# Patient Record
Sex: FEMALE | Race: WHITE | NOT HISPANIC OR LATINO | Employment: FULL TIME | URBAN - METROPOLITAN AREA
[De-identification: names, ages, dates, MRNs, and addresses within clinical notes are randomized per-mention and may not be internally consistent; named-entity substitution may affect disease eponyms.]

---

## 2017-01-07 ENCOUNTER — HOSPITAL ENCOUNTER (OUTPATIENT)
Dept: MAMMOGRAPHY | Facility: MEDICAL CENTER | Age: 47
Discharge: HOME | End: 2017-01-07
Payer: COMMERCIAL

## 2017-01-07 DIAGNOSIS — Z12.31 VISIT FOR SCREENING MAMMOGRAM: ICD-10-CM

## 2017-01-07 PROCEDURE — G0202 SCR MAMMO BI INCL CAD: HCPCS

## 2017-01-07 PROCEDURE — 77063 BREAST TOMOSYNTHESIS BI: CPT

## 2017-01-09 DIAGNOSIS — R92.8 OTHER ABNORMAL AND INCONCLUSIVE FINDINGS ON DIAGNOSTIC IMAGING OF BREAST: ICD-10-CM

## 2017-01-10 ENCOUNTER — HOSPITAL ENCOUNTER (OUTPATIENT)
Dept: ULTRASOUND IMAGING | Facility: CLINIC | Age: 47
Discharge: HOME | End: 2017-01-10
Payer: COMMERCIAL

## 2017-01-10 ENCOUNTER — HOSPITAL ENCOUNTER (OUTPATIENT)
Dept: MAMMOGRAPHY | Facility: CLINIC | Age: 47
Discharge: HOME | End: 2017-01-10
Payer: COMMERCIAL

## 2017-01-10 DIAGNOSIS — R92.8 ABNORMAL SCREENING MAMMOGRAM: ICD-10-CM

## 2017-01-10 PROCEDURE — 76642 ULTRASOUND BREAST LIMITED: CPT

## 2017-01-10 PROCEDURE — G0206 DX MAMMO INCL CAD UNI: HCPCS

## 2017-01-11 ENCOUNTER — GENERIC CONVERSION - ENCOUNTER (OUTPATIENT)
Dept: OTHER | Facility: OTHER | Age: 47
End: 2017-01-11

## 2017-02-07 ENCOUNTER — GENERIC CONVERSION - ENCOUNTER (OUTPATIENT)
Dept: OTHER | Facility: OTHER | Age: 47
End: 2017-02-07

## 2017-04-14 ENCOUNTER — ALLSCRIPTS OFFICE VISIT (OUTPATIENT)
Dept: OTHER | Facility: OTHER | Age: 47
End: 2017-04-14

## 2017-07-18 ENCOUNTER — TRANSCRIBE ORDERS (OUTPATIENT)
Dept: ADMINISTRATIVE | Facility: HOSPITAL | Age: 47
End: 2017-07-18

## 2017-07-18 DIAGNOSIS — R92.8 OTHER ABNORMAL AND INCONCLUSIVE FINDINGS ON DIAGNOSTIC IMAGING OF BREAST: ICD-10-CM

## 2017-07-18 DIAGNOSIS — R92.8 ABNORMAL MAMMOGRAM: Primary | ICD-10-CM

## 2017-07-26 ENCOUNTER — HOSPITAL ENCOUNTER (OUTPATIENT)
Dept: ULTRASOUND IMAGING | Facility: CLINIC | Age: 47
Discharge: HOME | End: 2017-07-26
Payer: COMMERCIAL

## 2017-07-26 ENCOUNTER — HOSPITAL ENCOUNTER (OUTPATIENT)
Dept: MAMMOGRAPHY | Facility: CLINIC | Age: 47
Discharge: HOME | End: 2017-07-26
Payer: COMMERCIAL

## 2017-07-26 ENCOUNTER — GENERIC CONVERSION - ENCOUNTER (OUTPATIENT)
Dept: OTHER | Facility: OTHER | Age: 47
End: 2017-07-26

## 2017-07-26 DIAGNOSIS — R92.8 OTHER ABNORMAL AND INCONCLUSIVE FINDINGS ON DIAGNOSTIC IMAGING OF BREAST: ICD-10-CM

## 2017-07-26 PROCEDURE — 76642 ULTRASOUND BREAST LIMITED: CPT

## 2017-07-26 PROCEDURE — G0206 DX MAMMO INCL CAD UNI: HCPCS

## 2017-10-18 ENCOUNTER — ALLSCRIPTS OFFICE VISIT (OUTPATIENT)
Dept: OTHER | Facility: OTHER | Age: 47
End: 2017-10-18

## 2017-10-19 NOTE — PROGRESS NOTES
Assessment  1  GERD (gastroesophageal reflux disease) (530 81) (K21 9)   2  Allergic rhinitis (477 9) (J30 9)   3  Anxiety (300 00) (F41 9)   4  Depression (311) (F32 9)    Plan  Allergic rhinitis    · Fluticasone Propionate 50 MCG/ACT Nasal Suspension; USE 1 SPRAY IN  EACH NOSTRIL TWICE DAILY  Allergic rhinitis, Anxiety, Depression, GERD (gastroesophageal reflux disease)    · Follow-up visit in 6 months Evaluation and Treatment  Follow-up  Status: Hold For -  Scheduling  Requested for: 18Oct2017  Anxiety, Depression    · From  Sertraline HCl - 25 MG Oral Tablet TAKE 1 TABLET DAILY To Sertraline  HCl - 50 MG Oral Tablet TAKE 1 TABLET DAILY  GERD (gastroesophageal reflux disease)    · Pantoprazole Sodium 40 MG Oral Tablet Delayed Release (Protonix); take 1  tablet by mouth once daily    Chief Complaint  patient present's today to discuss medication changes and refills   concerns about her allergies      History of Present Illness  HPI: Patient here to follow-up on allergies as well as GERD symptoms as well as anxiety and depression  Patient with increased allergic symptoms  Patient needs refill on Flonase  No fever noted  Patient also needs refills on her Protonix  Patient with some GI reflux symptoms  No chest pain or shortness of breath  No homicidal or suicidal ideation  Patient with increased stress related to   Review of Systems    Constitutional: as noted in HPI    ENT: no ear ache, no loss of hearing, no nosebleeds or nasal discharge, no sore throat or hoarseness  Cardiovascular: no complaints of slow or fast heart rate, no chest pain, no palpitations, no leg claudication or lower extremity edema  Respiratory: no complaints of shortness of breath, no wheezing, no dyspnea on exertion, no orthopnea or PND  Breasts: no complaints of breast pain, breast lump or nipple discharge     Gastrointestinal: no complaints of abdominal pain, no constipation, no nausea or diarrhea, no vomiting, no bloody stools  Genitourinary: no complaints of dysuria, no incontinence, no pelvic pain, no dysmenorrhea, no vaginal discharge or abnormal vaginal bleeding  Musculoskeletal: no complaints of arthralgia, no myalgia, no joint swelling or stiffness, no limb pain or swelling  Integumentary: no complaints of skin rash or lesion, no itching or dry skin, no skin wounds  Neurological: no complaints of headache, no confusion, no numbness or tingling, no dizziness or fainting  Active Problems  1  Abdominal pain (789 00) (R10 9)   2  Abnormal finding on mammography (793 80) (R92 8)   3  Allergic rhinitis (477 9) (J30 9)   4  Anxiety (300 00) (F41 9)   5  Depression (311) (F32 9)   6  GERD (gastroesophageal reflux disease) (530 81) (K21 9)   7  Heartburn (787 1) (R12)   8  Hemangioma of liver (228 04) (D18 03)   9  Hip pain, right (719 45) (M25 551)   10  History of allergy (V15 09) (Z88 9)   11  Seborrheic dermatitis (690 10) (L21 9)   12  Sinusitis (473 9) (J32 9)   13  Yeast infection (112 9) (B37 9)    Past Medical History  1  Acute upper respiratory infection (465 9) (J06 9)   2  History of acute sinusitis (V12 69) (Z87 09)   3  History of allergic rhinitis (V12 69) (Z87 09)   4  Personal history of malignant neoplasm of kidney (V10 52) (I66 300)  Active Problems And Past Medical History Reviewed: The active problems and past medical history were reviewed and updated today  Family History  Mother    1  Family history of Family Health Status Of Mother - Good  Father    2  Family history of Acute Myocardial Infarction (V17 3)   3  Family history of Family Health Status Of Father - Good    Social History   · Being A Social Drinker   · Never A Smoker    Surgical History  1  History of Elbow Surgery   2  History of Nephrectomy Left    Current Meds   1  BuPROPion HCl ER (XL) 150 MG Oral Tablet Extended Release 24 Hour; Take 1 tablet   daily;    Therapy: 14Apr2017 to (Evaluate:35Eaj5142)  Requested for: 97AAK8007; Last   Rx:99Hak6624 Ordered   2  DrRx Mycolog II Cream 15 Grams; APPLY TWICE DAILY TO THE AREA; Therapy: 14Apr2017 to (Last Rx:96Wtw6604) Ordered   3  Fluticasone Propionate 50 MCG/ACT Nasal Suspension; USE 1 SPRAY IN EACH   NOSTRIL TWICE DAILY; Last Rx:14Apr2017 Ordered   4  Montelukast Sodium 10 MG Oral Tablet; TAKE 1 TABLET DAILY; Therapy: 08QJG0134 to (Eual Slate)  Requested for: 97HPP9147; Last   Rx:67Fbm8907 Ordered   5  Pantoprazole Sodium 40 MG Oral Tablet Delayed Release; take 1 tablet by mouth once   daily; Therapy: 21GCU9287 to (Evaluate:13Jun2017)  Requested for: 14Apr2017; Last   Rx:14Apr2017 Ordered   6  Sertraline HCl - 25 MG Oral Tablet; TAKE 1 TABLET DAILY; Therapy: 42Vvc3159 to ([facility])  Requested for: 84BSD3129; Last   Rx:11Oct2017 Ordered   7  ZyrTEC Allergy 10 MG Oral Tablet; TAKE 1 TABLET AT BEDTIME; Therapy: 66JUC9508 to (Evaluate:02Nov2014) Recorded    The medication list was reviewed and updated today  Allergies  1  E-Mycin TBEC   2  Escitalopram Oxalate TABS   3  Levaquin   4  Penicillins    Vitals   Recorded: 57VHC6790 01:32PM   Temperature 98 7 F, Tympanic   Systolic 993, LUE, Sitting   Diastolic 76, LUE, Sitting   Height 5 ft 3 in   Weight 177 lb 6 oz   BMI Calculated 31 42   BSA Calculated 1 84     Physical Exam    Constitutional   General appearance: No acute distress, well appearing and well nourished  Eyes   Conjunctiva and lids: No swelling, erythema or discharge  Pupils and irises: Equal, round and reactive to light  Ears, Nose, Mouth, and Throat   External inspection of ears and nose: Normal     Otoscopic examination: Tympanic membranes translucent with normal light reflex  Canals patent without erythema  Nasal mucosa, septum, and turbinates: Normal without edema or erythema  Oropharynx: Abnormal  -- pnd  Pulmonary   Respiratory effort: No increased work of breathing or signs of respiratory distress      Auscultation of lungs: Clear to auscultation  Cardiovascular   Palpation of heart: Normal PMI, no thrills  Auscultation of heart: Normal rate and rhythm, normal S1 and S2, without murmurs  Examination of extremities for edema and/or varicosities: Normal     Abdomen   Abdomen: Non-tender, no masses  Liver and spleen: No hepatomegaly or splenomegaly  Lymphatic   Palpation of lymph nodes in neck: No lymphadenopathy  Musculoskeletal   Gait and station: Normal     Digits and nails: Normal without clubbing or cyanosis  Inspection/palpation of joints, bones, and muscles: Normal     Skin   Skin and subcutaneous tissue: Normal without rashes or lesions  Neurologic   Cranial nerves: Cranial nerves 2-12 intact  Reflexes: 2+ and symmetric  Sensation: No sensory loss  Psychiatric   Orientation to person, place, and time: Normal     Mood and affect: Normal          Results/Data  PHQ-9 Adult Depression Screening [id number] 01:34PM User, [facility]     Test Name Result Flag Reference   PHQ-9 Adult Depression Score 0     Over the last two weeks, how often have you been bothered by any of the following problems? Little interest or pleasure in doing things: Not at all - 0  Feeling down, depressed, or hopeless: Not at all - 0  Trouble falling or staying asleep, or sleeping too much: Not at all - 0  Feeling tired or having little energy: Not at all - 0  Poor appetite or over eating: Not at all - 0  Feeling bad about yourself - or that you are a failure or have let yourself or your family down: Not at all - 0  Trouble concentrating on things, such as reading the newspaper or watching television: Not at all - 0  Moving or speaking so slowly that other people could have noticed   Or the opposite -  being so fidgety or restless that you have been moving around a lot more than usual: Not at all - 0  Thoughts that you would be better off dead, or of hurting yourself in some way: Not at all - 0   PHQ-9 Adult Depression Screening Negative PHQ-9 Difficulty Level Not difficult at all     PHQ-9 Severity No Depression         Signatures   Electronically signed by : Freya Calloway DO; Oct 18 2017  1:52PM EST                       (Author)

## 2018-01-10 NOTE — RESULT NOTES
Message   Call patient  Patient's mammogram and ultrasound shows probable cyst right breast   Recommend repeat mammogram with ultrasound in 6 months     Verified Results  [facility] DIAGNOSTIC RIGHT W CAD [id number] 12:35PM Debra Julio     Test Name Result Flag Reference   [facility] DIAGNOSTIC RIGHT W CAD (Report)     Patient History:   Patient has history of other cancer at age 28  Family history of premenopausal breast cancer in maternal aunt at   age 40, breast cancer in mother at age 59, and breast cancer in    maternal grandmother at age 61  Took hormonal contraceptives for 1 month  Patient has never smoked  Patient's BMI is 26 6  Reason for exam: additional evaluation requested from abnormal    screening  78-year-old female with asymmetric patch of tissue containing a    few calcifications seen in the right breast on recent screening    mammogram      Mammo Diagnostic Right W CAD: January 10, 2017 - Check In #:    [de-identified]   CC with magnification and ML view(s) were taken of the right    breast      Technologist: JASMINE Fernandez (JASMINE)(M)   Prior study comparison: January 7, 2017, mammo screening    bilateral W DBT and CAD, performed at [place] [address]  December 31, 2014, bilateral digital screening    mammogram, performed at [place] [address]  September 21, 2013, digital bilateral screening    mammogram, performed at [address] [place] [address]  There are scattered fibroglandular densities  US Breast Right Limited: January 10, 2017 - Check In #: [de-identified]   Technologist: Freeman Berg RDMS   Examination of the additional views the right breast reveals that   the patch of tissue in the medial right breast spreads out but    does not entirely dissipate  This patch of tissue was present on   prior studies  This is felt to be located in the upper breast    on the lateral view   There are scattered groups of    calcifications in the right breast  2 of the more grouped    collection of calcifications are located in the upper breast at    the 1:00 to 2 o'clock position, 7 and 11 cm from the nipple  In    comparing prior studies to the current examination, faint    calcifications were seen in the right breast  However, as no    prior magnification views were obtained, the patient should    return for repeat right diagnostic mammogram in 6 months to re    evaluate these calcifications  Following diagnostic mammography, the patient was taken to the    ultrasound suite  RIGHT BREAST ULTRASOUND:     At the 2 o'clock position of the right breast, 3 cm from the    nipple, located posteriorly in the breast, there is a    well-defined hypoechoic nodule which measures 0 9 x 0 4 x 0 6 cm  A linear area of increased echogenicity is seen along the    posterior aspect of this nodule, possible layering calcifications   or posterior enhancement  I suspect this represents a    complicated cyst  This may correspond to the asymmetry on    mammography, although I cannot be entirely certain of this  No other cystic or solid nodules are identified  There are no    abnormal areas of shadowing  1  Scattered probably benign-appearing calcifications are seen    in the right breast and should be reevaluated with repeat    diagnostic mammogram in 6 months  2  Medial right breast asymmetry does not entirely suggest    dissipate and may correspond to a probably complicated cyst on    ultrasound, although I cannot be entirely certain of this  3  Repeat right breast ultrasound in 6 months recommended  ASSESSMENT: BiRad:3 - Probably Benign (Overall)   Right breast Right Diag Mamm: BiRad:3 - probably benign finding    in the right breast    Right breast Right Brst US: BiRad:3 - probably benign finding in    the right breast      Recommendation:   Follow-up diagnostic mammogram and ultrasound of the right breast   in 6 months     Analyzed by CAD     Transcription Location: JASMINE [address]ovla 98: [id number]     Risk Value(s):   Tyrer-Cuzick 10 Year: 5 792%, Tyrer-Cuzick Lifetime: 27 950%,    Myriad Table: 2 6%, TRINI 5 Year: 1 7%, NCI Lifetime: 17 8%, MRS    : Based on personal and/or family history,    consideration of hereditary risk assessment may be warranted  Signed by:   Mayo Manzanares MD   1/10/17     *US BREAST RIGHT LIMITED (DIAGNOSTIC) [id number] 12:35PM [facility] [place]     Test Name Result Flag Reference   US BREAST RIGHT LIMITED (Report)     Patient History:   Patient has history of other cancer at age 28  Family history of premenopausal breast cancer in maternal aunt at   age 40, breast cancer in mother at age 59, and breast cancer in    maternal grandmother at age 61  Took hormonal contraceptives for 1 month  Patient has never smoked  Patient's BMI is 26 6  Reason for exam: additional evaluation requested from abnormal    screening  70-year-old female with asymmetric patch of tissue containing a    few calcifications seen in the right breast on recent screening    mammogram      Mammo Diagnostic Right W CAD: January 10, 2017 - Check In #:    [de-identified]   CC with magnification and ML view(s) were taken of the right    breast      Technologist: JASMINE Esqueda (R)(M)   Prior study comparison: January 7, 2017, mammo screening    bilateral W DBT and CAD, performed at [place] [address]  December 31, 2014, bilateral digital screening    mammogram, performed at [place] [address]  September 21, 2013, digital bilateral screening    mammogram, performed at [address] [place] [address]  There are scattered fibroglandular densities  US Breast Right Limited: January 10, 2017 - Check In #: [de-identified]   Technologist: Neomi Mohs, RDMS   Examination of the additional views the right breast reveals that   the patch of tissue in the medial right breast spreads out but    does not entirely dissipate  This patch of tissue was present on   prior studies  This is felt to be located in the upper breast    on the lateral view  There are scattered groups of    calcifications in the right breast  2 of the more grouped    collection of calcifications are located in the upper breast at    the 1:00 to 2 o'clock position, 7 and 11 cm from the nipple  In    comparing prior studies to the current examination, faint    calcifications were seen in the right breast  However, as no    prior magnification views were obtained, the patient should    return for repeat right diagnostic mammogram in 6 months to re    evaluate these calcifications  Following diagnostic mammography, the patient was taken to the    ultrasound suite  RIGHT BREAST ULTRASOUND:     At the 2 o'clock position of the right breast, 3 cm from the    nipple, located posteriorly in the breast, there is a    well-defined hypoechoic nodule which measures 0 9 x 0 4 x 0 6 cm  A linear area of increased echogenicity is seen along the    posterior aspect of this nodule, possible layering calcifications   or posterior enhancement  I suspect this represents a    complicated cyst  This may correspond to the asymmetry on    mammography, although I cannot be entirely certain of this  No other cystic or solid nodules are identified  There are no    abnormal areas of shadowing  1  Scattered probably benign-appearing calcifications are seen    in the right breast and should be reevaluated with repeat    diagnostic mammogram in 6 months  2  Medial right breast asymmetry does not entirely suggest    dissipate and may correspond to a probably complicated cyst on    ultrasound, although I cannot be entirely certain of this  3  Repeat right breast ultrasound in 6 months recommended       ASSESSMENT: BiRad:3 - Probably Benign (Overall)   Right breast Right Diag Mamm: BiRad:3 - probably benign finding    in the right breast    Right breast Right Brst US: BiRad:3 - probably benign finding in    the right breast      Recommendation:   Follow-up diagnostic mammogram and ultrasound of the right breast   in 6 months  Analyzed by CAD     Transcription Location: [address]   Signing Station: [id number]     Risk Value(s):   Tyrer-Cuzick 10 Year: 5 792%, Tyrer-Cuzick Lifetime: 27 950%,    Myriad Table: 2 6%, TRINI 5 Year: 1 7%, NCI Lifetime: 17 8%, MRS    : Based on personal and/or family history,    consideration of hereditary risk assessment may be warranted     Signed by:   Lola Dhillon MD   1/10/17

## 2018-01-11 DIAGNOSIS — Z85.3 PERSONAL HISTORY OF MALIGNANT NEOPLASM OF BREAST: ICD-10-CM

## 2018-01-11 DIAGNOSIS — R92.8 OTHER ABNORMAL AND INCONCLUSIVE FINDINGS ON DIAGNOSTIC IMAGING OF BREAST: ICD-10-CM

## 2018-01-12 VITALS
SYSTOLIC BLOOD PRESSURE: 122 MMHG | WEIGHT: 177.38 LBS | HEIGHT: 63 IN | TEMPERATURE: 98.7 F | DIASTOLIC BLOOD PRESSURE: 76 MMHG | BODY MASS INDEX: 31.43 KG/M2

## 2018-01-14 VITALS
SYSTOLIC BLOOD PRESSURE: 122 MMHG | DIASTOLIC BLOOD PRESSURE: 70 MMHG | HEIGHT: 63 IN | BODY MASS INDEX: 32.45 KG/M2 | TEMPERATURE: 98.8 F | WEIGHT: 183.13 LBS

## 2018-01-15 ENCOUNTER — HOSPITAL ENCOUNTER (OUTPATIENT)
Dept: MAMMOGRAPHY | Facility: CLINIC | Age: 48
Discharge: HOME | End: 2018-01-15
Payer: COMMERCIAL

## 2018-01-15 ENCOUNTER — HOSPITAL ENCOUNTER (OUTPATIENT)
Dept: ULTRASOUND IMAGING | Facility: CLINIC | Age: 48
Discharge: HOME | End: 2018-01-15
Payer: COMMERCIAL

## 2018-01-15 ENCOUNTER — GENERIC CONVERSION - ENCOUNTER (OUTPATIENT)
Dept: OTHER | Facility: OTHER | Age: 48
End: 2018-01-15

## 2018-01-15 ENCOUNTER — GENERIC CONVERSION - ENCOUNTER (OUTPATIENT)
Dept: FAMILY MEDICINE CLINIC | Facility: CLINIC | Age: 48
End: 2018-01-15

## 2018-01-15 DIAGNOSIS — Z85.3 PERSONAL HISTORY OF MALIGNANT NEOPLASM OF BREAST: ICD-10-CM

## 2018-01-15 DIAGNOSIS — R92.8 OTHER ABNORMAL AND INCONCLUSIVE FINDINGS ON DIAGNOSTIC IMAGING OF BREAST: ICD-10-CM

## 2018-01-15 PROCEDURE — 77066 DX MAMMO INCL CAD BI: CPT

## 2018-01-17 NOTE — PROGRESS NOTES
Assessment   1  Sinusitis (473 9) (J32 9)  2  Hip pain, right (335 45) (M25 551)1      1 Amended By: Sarthak Nelson; Jan 26 2016 3:32 PM EST    Plan  Hip pain, right    · 1  CHIRO&NEURO (CHIROPRACTIC ) Physician Referral  Consult  Status: Hold For -  Scheduling  Requested for: [id number]   () Care Summary provided  : Yes1   Sinusitis    · Start: Azithromycin 250 MG Oral Tablet; TAKE 2 TABLETS ON DAY 1 THEN TAKE 1  TABLET A DAY FOR 4 DAYS   · Follow Up if Not Better Evaluation and Treatment  Follow-up  Status: Complete  Done: 59YJM33313    · Drink at least 6 glasses of water or juice a day ; Status:Complete;   Done: 22USA55274      1 Amended By: Sarthak Nelson; Jan 26 2016 3:33 PM EST    Discussion/Summary    Pt  will cont  flonase  Chief Complaint  C/o sinus problems and pressure for 2 weeks  History of Present Illness  HPI: Pt  is here for sinus pain and pressure x 2 weeks  No sore throat  Patient with some bloody nasal discharge  Patient with some chills also  Patient with nausea but no vomiting  No significant myalgias or diarrhea  No cough or sputum production  Patient using ibuprofen  Review of Systems    Constitutional: as noted in HPI    ENT: as noted in HPI  Cardiovascular: no complaints of slow or fast heart rate, no chest pain, no palpitations, no leg claudication or lower extremity edema  Respiratory: no complaints of shortness of breath, no wheezing, no dyspnea on exertion, no orthopnea or PND  Breasts: no complaints of breast pain, breast lump or nipple discharge  Gastrointestinal: no complaints of abdominal pain, no constipation, no nausea or diarrhea, no vomiting, no bloody stools  Genitourinary: no complaints of dysuria, no incontinence, no pelvic pain, no dysmenorrhea, no vaginal discharge or abnormal vaginal bleeding  Musculoskeletal: no complaints of arthralgia, no myalgia, no joint swelling or stiffness, no limb pain or swelling     Integumentary: no complaints of skin rash or lesion, no itching or dry skin, no skin wounds  Neurological: no complaints of headache, no confusion, no numbness or tingling, no dizziness or fainting  Active Problems   1  Abdominal pain (789 00) (R10 9)  2  Allergic rhinitis (477 9) (J30 9)  3  Anxiety (300 00) (F41 9)  4  Depression (311) (F32 9)  5  GERD (gastroesophageal reflux disease) (530 81) (K21 9)  6  Heartburn (787 1) (R12)  7  Hemangioma of liver (228 04) (D18 03)  8  Hip pain, right (719 45) (M25 551)  9  History of allergy (V15 09) (Z88 9)  10  Sinusitis (473 9) (J32 9)  11  Yeast infection (112 9) (B37 9)    Past Medical History   1  Acute upper respiratory infection (465 9) (J06 9)  2  History of acute sinusitis (V12 69) (Z87 09)  3  History of allergic rhinitis (V12 69) (Z87 09)  4  Personal history of malignant neoplasm of kidney (V10 52) (R74 962)    Family History   1  Family history of Family Health Status Of Mother - Good   2  Family history of Acute Myocardial Infarction (V17 3)  3  Family history of Family Health Status Of Father - Good    Social History    · Being A Social Drinker   · Never A Smoker    Surgical History   1  History of Elbow Surgery  2  History of Nephrectomy Left    Current Meds  1  Azelastine HCl - 0 1 % Nasal Solution; USE 1 SPRAY IN EACH NOSTRIL TWICE DAILY; Therapy: 81YOH3163 to (Last Rx:11Nov2015)  Requested for: 44IRO0840 Ordered  2  Cefdinir 300 MG Oral Capsule; TAKE 1 CAPSULE EVERY 12 HOURS UNTIL GONE;   Therapy: 81FSK0584 to (Evaluate:21Nov2015); Last Rx:11Nov2015 Ordered  3  Dymista 137-50 MCG/ACT Nasal Suspension; Therapy: 74MVR5811 to Recorded  4  Esomeprazole Magnesium 40 MG Oral Capsule Delayed Release; TAKE 1 CAPSULE   DAILY; Therapy: 71RJK1073 to (Evaluate:05Jun2016)  Requested for: 71OUV4118; Last   Rx:83Pqx5890 Ordered  5  Flonase 50 MCG/ACT SUSP; Therapy: (Recorded:11Nov2015) to Recorded  6  Montelukast Sodium 10 MG Oral Tablet; TAKE 1 TABLET DAILY;    Therapy: 09QUG9565 to (Shelly Pou)  Requested for: 26NNI9896; Last   Rx:11Nov2015 Ordered  7  Sertraline HCl - 25 MG Oral Tablet; TAKE 1 TABLET DAILY; Therapy: 17Nlt8023 to (Evaluate:11Mar2016)  Requested for: 18CRC2261; Last   Rx:11Jan2016 Ordered  8  ZyrTEC Allergy 10 MG Oral Tablet; TAKE 1 TABLET AT BEDTIME; Therapy: 32JZU4932 to (Evaluate:02Nov2014) Recorded    Allergies   1  E-Mycin TBEC  2  Escitalopram Oxalate TABS  3  Levaquin  4  Penicillins    Vitals   Recorded: 42XAQ1643 03:08PM   Temperature 67 5 F   Systolic 726   Diastolic 90   Weight 980 lb    BMI Calculated 29 76   BSA Calculated 1 8     Physical Exam    Constitutional   General appearance: Abnormal     Ears, Nose, Mouth, and Throat   External inspection of ears and nose: Normal     Otoscopic examination: Tympanic membranes translucent with normal light reflex  Canals patent without erythema  Nasal mucosa, septum, and turbinates: Normal without edema or erythema  Oropharynx: Abnormal   pnd    Pulmonary   Respiratory effort: No increased work of breathing or signs of respiratory distress  Auscultation of lungs: Clear to auscultation  Cardiovascular   Palpation of heart: Normal PMI, no thrills  Auscultation of heart: Normal rate and rhythm, normal S1 and S2, without murmurs  Examination of extremities for edema and/or varicosities: Normal     Psychiatric   Orientation to person, place, and time: Normal     Mood and affect: Abnormal   Mood and Affect: not anxious and depressed          Signatures   Electronically signed by : Jesus Pak DO; Jan 26 2016  3:34PM EST                       (Author)

## 2018-01-18 NOTE — RESULT NOTES
Verified Results  [facility] DIAGNOSTIC RIGHT W CAD [id number] 07:50AM Christiano Block Order Number: [id number]    - Patient Instructions: To schedule this appointment, please contact Central Scheduling at [phone]  Do not wear any perfume, powder, lotion or deodorant on breast or underarm area  Please bring your doctors order, referral (if needed) and insurance information with you on the day of the test  Failure to bring this information may result in this test being rescheduled  Arrive 15 minutes prior to your appointment time to register  On the day of your test, please bring any prior mammogram or breast studies with you that were not performed at a [facility]  Failure to bring prior exams may result in your test needing to be rescheduled  Test Name Result Flag Reference   MAMMO DIAGNOSTIC RIGHT W CAD (Report)     Patient History:   Patient has history of other cancer at age 28  Family history of breast cancer at age 59 in mother, breast    cancer at age 61 in maternal grandmother, premenopausal breast    cancer at age 40 in maternal aunt  Took hormonal contraceptives for 10 years 1 month  Patient has never smoked  Patient's BMI is 26 6  Reason for exam: follow-up at short interval from prior study  Mammo Diagnostic Right W CAD: July 26, 2017 - Check In #: [de-identified]   CC, MLO, spot compression CC with magnification, and spot    compression ML with magnification view(s) were taken of the right   breast      Technologist: JASMINE Curtis (JASMINE)(M)   Prior study comparison: January 10, 2017, mammo diagnostic right    W CAD performed at [address] [place] [address]  January    10, 2017, US breast right limited performed at [address] [place] [address]  January 7, 2017, mammo screening    bilateral W DBT and CAD, performed at [place] [address]   December 31, 2014, bilateral digital screening    mammogram, performed at [address] [facility] [address]  September 21, 2013, digital bilateral screening    mammogram, performed at [address] [place] [address]  August 11, 2011, bilateral digital screening mammogram, performed at [address] [place] [address]  July 29, 2010, bilateral digital    screening mammogram, performed at [facility] 166 Breast Right Limited: July 26, 2017 - Check In #: [de-identified]   Standard views  Technologist: Radha Rascon RDMS   Diagnostic images of RIGHT breast were obtained  Multiple groups   of microcalcification are seen throughout the RIGHT breast, many   of which are faint but suggest a component of layering, favoring   benign sediment calcification  Continued six-month follow-up    recommended  The prior mammographic density in the medial RIGHT    breast is no longer appreciated on this exam      Targeted ultrasound of RIGHT was performed with a high frequency    linear transducer  There are 2 o'clock position 3 cm from the    nipple in the deep aspect of the breast there remains an oval    well-defined hypoechoic nodule, 9 x 4 x 6 mm in size with    features favoring a cyst  Low-level internal echoes felt more    likely to be sonographic artifact due to depth  No suspicious    interval change compared to prior  Follow-up ultrasound in one    year to confirm stability  CONCLUSION: Indeterminant, probably benign microcalcification for   which six-month follow-up recommended  Stable sonographic    finding  One-year follow-up recommended  ACR BI-RADSï¾® Assessments: BiRad:3 - Probably Benign (Overall)   Right breast Right Diag Mamm: BiRad:3 - probably benign finding    in the right breast    Right breast Right Brst US: BiRad:3 - probably benign finding in    the right breast      Recommendation:   Follow-up diagnostic mammogram of the right breast in 6 months  Ultrasound of the right breast in 12 months     Analyzed by CAD     The patient is scheduled in a reminder system  Transcription Location: JASMINE Sandoval 98: [id number]     Risk Value(s):   Tyrer-Cuzick 10 Year: 5 800%, Tyrer-Cuzick Lifetime: 27 900%,    Myriad Table: 2 6%, TRINI 5 Year: 1 7%, NCI Lifetime: 17 8%, MRS    : Based on personal and/or family history,    consideration of hereditary risk assessment may be warranted  *US BREAST RIGHT LIMITED (DIAGNOSTIC) [id number] 07:50AM Janey Troung Order Number: [id number]    - Patient Instructions: To schedule this appointment, please contact Central Scheduling at [phone]  Test Name Result Flag Reference   US BREAST RIGHT LIMITED (Report)     Patient History:   Patient has history of other cancer at age 28  Family history of breast cancer at age 59 in mother, breast    cancer at age 61 in maternal grandmother, premenopausal breast    cancer at age 40 in maternal aunt  Took hormonal contraceptives for 10 years 1 month  Patient has never smoked  Patient's BMI is 26 6  Reason for exam: follow-up at short interval from prior study  Mammo Diagnostic Right W CAD: July 26, 2017 - Check In #: [de-identified]   CC, MLO, spot compression CC with magnification, and spot    compression ML with magnification view(s) were taken of the right   breast      Technologist: JASMINE Mcguire (JASMINE)(M)   Prior study comparison: January 10, 2017, mammo diagnostic right    W CAD performed at [address] [place] [address]  January    10, 2017, US breast right limited performed at [address] [place] [address]  January 7, 2017, mammo screening    bilateral W DBT and CAD, performed at [place] [address]  December 31, 2014, bilateral digital screening    mammogram, performed at [place] [address]  September 21, 2013, digital bilateral screening    mammogram, performed at [address] [place] [address]   August 11, 2011, bilateral digital screening mammogram, performed at [address] [place] [address] [facility]  July 29, 2010, bilateral digital    [facility] Right [facility]: July 26, 2017 - Check In #: [de-identified]   Standard views  Technologist: Emely New RDMS   Diagnostic images of RIGHT breast were obtained  Multiple groups   of microcalcification are seen throughout the RIGHT breast, many   of which are faint but suggest a component of layering, favoring   benign sediment calcification  Continued six-month follow-up    recommended  The prior mammographic density in the medial RIGHT    breast is no longer appreciated on this exam      Targeted ultrasound of RIGHT was performed with a high frequency    linear transducer  There are 2 o'clock position 3 cm from the    nipple in the deep aspect of the breast there remains an oval    well-defined hypoechoic nodule, 9 x 4 x 6 mm in size with    features favoring a cyst  Low-level internal echoes felt more    likely to be sonographic artifact due to depth  No suspicious    interval change compared to prior  Follow-up ultrasound in one    year to confirm stability  CONCLUSION: Indeterminant, probably benign microcalcification for   which six-month follow-up recommended  Stable sonographic    finding  One-year follow-up recommended  ACR BI-RADSï¾® Assessments: BiRad:3 - Probably Benign (Overall)   Right breast Right Diag Mamm: BiRad:3 - probably benign finding    in the right breast    Right breast Right Brst US: BiRad:3 - probably benign finding in    the right breast      Recommendation:   Follow-up diagnostic mammogram of the right breast in 6 months  Ultrasound of the right breast in 12 months  Analyzed by CAD     The patient is scheduled in a reminder system       Transcription Location: [facility] 98: [id number]     Risk Value(s):   Tyrer-Cuzick 10 Year: 5 800%, Tyrer-Cuzick Lifetime: 27 900%,    Myriad Table: 2 6%, TRINI 5 Year: 1 7%, NCI Lifetime: 17 8%, MRS : Based on personal and/or family history,    consideration of hereditary risk assessment may be warranted

## 2018-01-23 NOTE — RESULT NOTES
Verified Results  MAMMO DIAGNOSTIC BILATERAL W CAD [id number] 01:21PM Jason Valencia Order Number: [id number]    - Patient Instructions: To schedule this appointment, please contact Central Scheduling at [phone]  Do not wear any perfume, powder, lotion or deodorant on breast or underarm area  Please bring your doctors order, referral (if needed) and insurance information with you on the day of the test  Failure to bring this information may result in this test being rescheduled  Arrive 15 minutes prior to your appointment time to register  On the day of your test, please bring any prior mammogram or breast studies with you that were not performed at a [facility]  Failure to bring prior exams may result in your test needing to be rescheduled  Test Name Result Flag Reference   MAMMO DIAGNOSTIC BILATERAL W CAD (Report)     Patient History:   Patient has history of other cancer at age 28  Family history of breast cancer at age 59 in mother, breast    cancer at age 61 in maternal grandmother, premenopausal breast    cancer at age 40 in maternal aunt  Took hormonal contraceptives for 10 years 1 month  Patient has never smoked  Patient's BMI is 26 6  Reason for exam: follow-up at short interval from prior study  Follow-up right breast calcifications  Mammo Diagnostic Bilateral W CAD: January 15, 2018 - Check In #:    [de-identified]   Bilateral MLO and CC view(s) were taken  CC with magnification    and ML with magnification view(s) were taken of the right breast      Technologist: GAVIOTA Garay   Prior study comparison: July 26, 2017, mammo diagnostic right W    CAD performed at [address] [place] [address]  January 10,    2017, mammo diagnostic right W CAD performed at [address] [place] [address]  January 7, 2017, mammo screening    bilateral W DBT and CAD, performed at [address] [place] [address]   December 31, 2014, bilateral digital screening    mammogram, performed at [facility]  September 21, 2013, digital bilateral screening    mammogram, performed at [facility]  August 11, 2011, bilateral digital screening mammogram, performed [address]  July 29, 2010, bilateral digital    screening mammogram, performed [address]      The breast tissue is heterogeneously dense, potentially limiting    the sensitivity of mammography  Patient risk, included in this    report, assists in determining the appropriate screening regimen    (such as 3-D mammography or the inclusion of automated breast    ultrasound or MRI)  3-D mammography may also remain indicated as    screening  Six-month follow-up right breast diagnostic mammogram   was performed for right inner breast asymmetry and multiple    groupings a right sided calcifications  The right breast    asymmetry previously described in the medial right breast is much   less prominent than on previous examinations  Prior diagnostic    mammogram recommended 6 month follow-up of multiple groups of    microcalcifications in the right breast  Magnification views in    the CC and ML projection were performed  Again noted are    multiple groupings of calcifications which are ill-defined in    appearance on the CC projection many of which demonstrate mild    layering in the ML view  These findings suggest a benign process   such as milk of calcium  Continued six-month follow-up is    recommended to confirm two-year stability these findings  Standard CC and MLO views of the left breast were performed and    demonstrate no suspicious mass, microcalcification, skin    thickening or architectural distortion  Overall no significant    change from prior exams  Six-month follow-up diagnostic mammogram is recommended to    confirm stability   Patient will be due for right breast    ultrasound in 6 months as well given one year recommended    follow-up from prior ultrasound examination  ACR BI-RADSï¾® Assessments: BiRad:3 - Probably Benign - Right     Recommendation:   Follow-up diagnostic mammogram and ultrasound of the right breast   in 6 months  Analyzed by CAD     The patient is scheduled in a reminder system for screening    mammography  8-10% of cancers will be missed on mammography  Management of a    palpable abnormality must be based on clinical grounds  Patients   will be notified of their results via letter from our facility  Accredited by [employer] of Radiology and FDA  Transcription Location: [facility] 98: [id number]     Risk Value(s):   Tyrer-Cuzick 10 Year: 6 000%, Tyrer-Cuzick Lifetime: 27 600%,    Myriad Table: 2 6%, TRINI 5 Year: 1 7%, NCI Lifetime: 17 5%, MRS    : Based on personal and/or family history,    consideration of hereditary risk assessment may be warranted     Signed by:   Sharon Walton MD   1/15/18

## 2018-03-01 DIAGNOSIS — K21.9 GASTROESOPHAGEAL REFLUX DISEASE, ESOPHAGITIS PRESENCE NOT SPECIFIED: Primary | ICD-10-CM

## 2018-03-01 RX ORDER — PANTOPRAZOLE SODIUM 40 MG/1
1 TABLET, DELAYED RELEASE ORAL DAILY
COMMUNITY
Start: 2016-06-23 | End: 2018-03-01 | Stop reason: SDUPTHER

## 2018-03-01 RX ORDER — PANTOPRAZOLE SODIUM 40 MG/1
40 TABLET, DELAYED RELEASE ORAL DAILY
Qty: 90 TABLET | Refills: 1 | Status: SHIPPED | OUTPATIENT
Start: 2018-03-01 | End: 2018-04-23 | Stop reason: SDUPTHER

## 2018-03-31 DIAGNOSIS — J30.1 CHRONIC SEASONAL ALLERGIC RHINITIS DUE TO POLLEN: Primary | ICD-10-CM

## 2018-04-02 RX ORDER — FLUTICASONE PROPIONATE 50 MCG
SPRAY, SUSPENSION (ML) NASAL
Qty: 1 BOTTLE | Refills: 1 | Status: SHIPPED | OUTPATIENT
Start: 2018-04-02 | End: 2018-04-23 | Stop reason: SDUPTHER

## 2018-04-23 ENCOUNTER — OFFICE VISIT (OUTPATIENT)
Dept: FAMILY MEDICINE CLINIC | Facility: CLINIC | Age: 48
End: 2018-04-23
Payer: COMMERCIAL

## 2018-04-23 VITALS
DIASTOLIC BLOOD PRESSURE: 80 MMHG | BODY MASS INDEX: 31.36 KG/M2 | HEIGHT: 63 IN | SYSTOLIC BLOOD PRESSURE: 110 MMHG | WEIGHT: 177 LBS

## 2018-04-23 DIAGNOSIS — F32.0 CURRENT MILD EPISODE OF MAJOR DEPRESSIVE DISORDER WITHOUT PRIOR EPISODE (HCC): ICD-10-CM

## 2018-04-23 DIAGNOSIS — J30.1 SEASONAL ALLERGIC RHINITIS DUE TO POLLEN: ICD-10-CM

## 2018-04-23 DIAGNOSIS — F41.9 ANXIETY: ICD-10-CM

## 2018-04-23 DIAGNOSIS — M54.16 LUMBAR RADICULITIS: ICD-10-CM

## 2018-04-23 DIAGNOSIS — J30.1 CHRONIC SEASONAL ALLERGIC RHINITIS DUE TO POLLEN: ICD-10-CM

## 2018-04-23 DIAGNOSIS — K21.9 GASTROESOPHAGEAL REFLUX DISEASE WITHOUT ESOPHAGITIS: Primary | ICD-10-CM

## 2018-04-23 DIAGNOSIS — K21.9 GASTROESOPHAGEAL REFLUX DISEASE, ESOPHAGITIS PRESENCE NOT SPECIFIED: ICD-10-CM

## 2018-04-23 PROCEDURE — 99214 OFFICE O/P EST MOD 30 MIN: CPT | Performed by: FAMILY MEDICINE

## 2018-04-23 RX ORDER — MELOXICAM 15 MG/1
15 TABLET ORAL DAILY
Qty: 30 TABLET | Refills: 0 | Status: SHIPPED | OUTPATIENT
Start: 2018-04-23 | End: 2018-05-26 | Stop reason: SDUPTHER

## 2018-04-23 RX ORDER — PANTOPRAZOLE SODIUM 40 MG/1
40 TABLET, DELAYED RELEASE ORAL DAILY
Qty: 90 TABLET | Refills: 1 | Status: SHIPPED | OUTPATIENT
Start: 2018-04-23 | End: 2018-11-26

## 2018-04-23 RX ORDER — FLUTICASONE PROPIONATE 50 MCG
1 SPRAY, SUSPENSION (ML) NASAL 2 TIMES DAILY
Qty: 1 BOTTLE | Refills: 5 | Status: SHIPPED | OUTPATIENT
Start: 2018-04-23 | End: 2018-07-26 | Stop reason: SDUPTHER

## 2018-04-23 RX ORDER — MONTELUKAST SODIUM 10 MG/1
10 TABLET ORAL
Qty: 90 TABLET | Refills: 1 | Status: SHIPPED | OUTPATIENT
Start: 2018-04-23 | End: 2018-09-12

## 2018-04-23 RX ORDER — FLUTICASONE PROPIONATE 50 MCG
1 SPRAY, SUSPENSION (ML) NASAL 2 TIMES DAILY
COMMUNITY
End: 2018-04-23

## 2018-04-23 NOTE — PROGRESS NOTES
Assessment/Plan:   patient will continue follow-up with chiropractor regarding lumbar radiculitis  Patient to consider physical therapy  Patient did have x-rays done  To consider MRI if symptoms persist   To consider injections with pain management  Patient use Singulair for allergies  Patient will continue with Flonase  May add Zyrtec  Continue Zoloft for depression anxiety  Diagnoses and all orders for this visit:    Gastroesophageal reflux disease without esophagitis    Anxiety  -     sertraline (ZOLOFT) 50 mg tablet; Take 1 tablet (50 mg total) by mouth daily    Current mild episode of major depressive disorder without prior episode [facility])    Lumbar radiculitis  -     Ambulatory referral to Physical Therapy; Future  -     meloxicam (MOBIC) 15 mg tablet; Take 1 tablet (15 mg total) by mouth daily    Seasonal allergic rhinitis due to pollen    Gastroesophageal reflux disease, esophagitis presence not specified  -     pantoprazole (PROTONIX) 40 mg tablet; Take 1 tablet (40 mg total) by mouth daily    Chronic seasonal allergic rhinitis due to pollen  -     fluticasone (FLONASE) 50 mcg/act nasal spray; 1 spray into each nostril 2 (two) times a day  -     montelukast (SINGULAIR) 10 mg tablet; Take 1 tablet (10 mg total) by mouth daily at bedtime    Other orders  -     Discontinue: sertraline (ZOLOFT) 50 mg tablet; Take 1 tablet by mouth daily  -     Discontinue: fluticasone (FLONASE) 50 mcg/act nasal spray; 1 spray into each nostril 2 (two) times a day  -     Nystatin-Triamcinolone (MYCOLOG II EX); 2 (two) times a day          Subjective:      Patient ID: Estephanie Rodriguez is a 52 y o  female  Patient here to follow-up on GERD as well as anxiety and depression  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  It depression anxiety stable overall  Patient with some belching  Patient with some pain in the right buttocks and down her right leg  Patient is seeing chiropractor        Back Pain     Medication Refill         The following portions of the patient's history were reviewed and updated as appropriate: allergies, current medications, past family history, past medical history, past social history, past surgical history and problem list     Review of Systems   Constitutional: Negative  HENT: Negative  Eyes: Negative  Respiratory: Negative  Cardiovascular: Negative  Gastrointestinal: Negative  Endocrine: Negative  Genitourinary: Negative  Musculoskeletal: Positive for back pain  Skin: Negative  Allergic/Immunologic: Negative  Neurological: Negative  Hematological: Negative  Psychiatric/Behavioral: Negative  Objective:      /80 (BP Location: Left arm, Patient Position: Sitting, Cuff Size: Standard)   Ht 5' 3" (1 6 m)   Wt 80 3 kg (177 lb)   LMP  (LMP Unknown)   BMI 31 35 kg/m²          Physical Exam   Constitutional: She is oriented to person, place, and time  She appears well-developed and well-nourished  No distress  HENT:   Head: Normocephalic  Right Ear: External ear normal    Left Ear: External ear normal    Mouth/Throat: Oropharynx is clear and moist  No oropharyngeal exudate  Eyes: EOM are normal  Pupils are equal, round, and reactive to light  Right eye exhibits no discharge  Left eye exhibits no discharge  No scleral icterus  Neck: Normal range of motion  Neck supple  No thyromegaly present  Cardiovascular: Normal rate, regular rhythm, normal heart sounds and intact distal pulses  Exam reveals no gallop and no friction rub  No murmur heard  Pulmonary/Chest: Effort normal and breath sounds normal  No respiratory distress  She has no wheezes  She has no rales  She exhibits no tenderness  Abdominal: Soft  Bowel sounds are normal  She exhibits no distension  There is no tenderness  There is no rebound and no guarding  Musculoskeletal: Normal range of motion  She exhibits no edema or tenderness     Lymphadenopathy: She has no cervical adenopathy  Neurological: She is oriented to person, place, and time  No cranial nerve deficit  She exhibits normal muscle tone  Coordination normal    Skin: Skin is warm and dry  No rash noted  She is not diaphoretic  No erythema  No pallor  Psychiatric: She has a normal mood and affect  Her behavior is normal  Judgment and thought content normal    Nursing note and vitals reviewed

## 2018-05-03 ENCOUNTER — EVALUATION (OUTPATIENT)
Dept: PHYSICAL THERAPY | Facility: CLINIC | Age: 48
End: 2018-05-03
Payer: COMMERCIAL

## 2018-05-03 DIAGNOSIS — M54.16 LUMBAR RADICULOPATHY: Primary | ICD-10-CM

## 2018-05-03 DIAGNOSIS — M54.16 LUMBAR RADICULITIS: ICD-10-CM

## 2018-05-03 PROCEDURE — 97162 PT EVAL MOD COMPLEX 30 MIN: CPT | Performed by: PHYSICAL THERAPIST

## 2018-05-03 PROCEDURE — 97140 MANUAL THERAPY 1/> REGIONS: CPT | Performed by: PHYSICAL THERAPIST

## 2018-05-03 PROCEDURE — G8994 SUB PT/OT GOAL STATUS: HCPCS | Performed by: PHYSICAL THERAPIST

## 2018-05-03 PROCEDURE — G8993 SUB PT/OT CURRENT STATUS: HCPCS | Performed by: PHYSICAL THERAPIST

## 2018-05-03 PROCEDURE — 97110 THERAPEUTIC EXERCISES: CPT | Performed by: PHYSICAL THERAPIST

## 2018-05-03 NOTE — PROGRESS NOTES
PT Evaluation     Today's date: 5/3/2018  Patient name: Romulo Singh  : 1970  MRN: [id number]  Referring provider: Balbina Yanez DO  Dx:   Encounter Diagnosis     ICD-10-CM    1  Lumbar radiculopathy M54 16    2  Lumbar radiculitis M54 16 Ambulatory referral to Physical Therapy                  Assessment/Plan   Pt presents to outpatient PT with pain, decreased rom and decreased functional mobility  Pt will benefit from skilled PT to address these deficits in order to achieve goals and maximize LOF  She responded well to extension exercises  Planned interventions:  Modalities prn, manual techniques, There-ex, Therapeutic activities  Frequency 2-3x/wk for 4-6 wks    Short term goals: Independent performance of comprehensive HEP  Decrease pain    Long term goals: Work performance is returned to max level of function  Performance of IADL's is returned to max level of function  Performance in ADL's and recreational activities is improved to max level of function    Subjective   This Pt presents with R sided LBP radiating into post/lat thigh into calf  She reports an insidious onset of pain after prolonged sitting last summer  Over the last 3-4  Months symptoms have increased significantly and have become constant  Has been seeing chiropractor over the last 4 wks with improvement  Relieving factors: Heat, prone lying, motrin  Aggravating factors: sitting, quick turns, walking, worsens as day goes on    Objective  ROM:   Flexion: mod-severe decrease with pain   Extension: min decrease   Right Rotation: mod dec with pain   Left Rotation: min dec  RFIS: NT    WILEY: NT    REIL: no pain, full ROM    RFIL: NT    Heel walk: ok  Toe walk ok  Full squat  Slump test + Right  Poor control of abdominals noted with TaA  Hypomobility noted with spring testing  Straight Leg Raise    Prone Knee Bend: NT    Tightness noted R paraspinals    Pt had no symptoms after REIL and prone prop    IP in correct sitting posture      Precautions: none    Daily Treatment Diary       Manuals 5/3            Lumbar PAs pmb            STM R paraspinals mb                                      Exercise Diary              Press ups 2x10            Prone prop 2 min             Posture correction x                                                                                                                                                                                                                                                                   Modalities

## 2018-05-08 ENCOUNTER — OFFICE VISIT (OUTPATIENT)
Dept: PHYSICAL THERAPY | Facility: CLINIC | Age: 48
End: 2018-05-08
Payer: COMMERCIAL

## 2018-05-08 DIAGNOSIS — M54.16 LUMBAR RADICULITIS: Primary | ICD-10-CM

## 2018-05-08 DIAGNOSIS — M54.16 LUMBAR RADICULOPATHY: ICD-10-CM

## 2018-05-08 PROCEDURE — 97140 MANUAL THERAPY 1/> REGIONS: CPT | Performed by: PHYSICAL THERAPIST

## 2018-05-08 PROCEDURE — 97110 THERAPEUTIC EXERCISES: CPT | Performed by: PHYSICAL THERAPIST

## 2018-05-08 NOTE — PROGRESS NOTES
Daily Note     Today's date: 2018  Patient name: Michelle Rose  : 1970  MRN: [id number]  Referring provider: Patti December   Dx:   Encounter Diagnosis     ICD-10-CM    1  Lumbar radiculitis M54 16    2  Lumbar radiculopathy M54 16                   Subjective: Pt notices improvement in sit to stand when using lumbar support  Objective: See treatment diary below  Supine to sit: R LE increased, corrected with rot mob  SLR + R  (-) after rot mob    Precautions: none     Daily Treatment Diary         Manuals 5/3  5/8                   Lumbar PAs pmb  mb                   STM R paraspinals mb  mb                    Supine rot mob   R                                           Exercise Diary                        Press ups 2x10  3x10                   Prone prop 2 min   1 min x3                   Posture correction x                      bike    10 min                    LTR    30                    SKTC    30" x3                                                                                                                                                                                                                                                                                                                                                                                                                   Modalities                                                                              Assessment: Tolerated treatment well   Patient       Plan: cont with POC

## 2018-05-10 ENCOUNTER — APPOINTMENT (OUTPATIENT)
Dept: PHYSICAL THERAPY | Facility: CLINIC | Age: 48
End: 2018-05-10
Payer: COMMERCIAL

## 2018-05-15 ENCOUNTER — OFFICE VISIT (OUTPATIENT)
Dept: PHYSICAL THERAPY | Facility: CLINIC | Age: 48
End: 2018-05-15
Payer: COMMERCIAL

## 2018-05-15 DIAGNOSIS — M54.16 LUMBAR RADICULOPATHY: ICD-10-CM

## 2018-05-15 DIAGNOSIS — M54.16 LUMBAR RADICULITIS: Primary | ICD-10-CM

## 2018-05-15 PROCEDURE — 97112 NEUROMUSCULAR REEDUCATION: CPT | Performed by: PHYSICAL THERAPIST

## 2018-05-15 PROCEDURE — 97140 MANUAL THERAPY 1/> REGIONS: CPT | Performed by: PHYSICAL THERAPIST

## 2018-05-15 PROCEDURE — 97110 THERAPEUTIC EXERCISES: CPT | Performed by: PHYSICAL THERAPIST

## 2018-05-15 NOTE — PROGRESS NOTES
Daily Note     Today's date: 5/15/2018  Patient name: Joanie Rodríguez  : 1970  MRN: [id number]  Referring provider: Mortimer Liming, DO  Dx:   Encounter Diagnosis     ICD-10-CM    1  Lumbar radiculitis M54 16    2  Lumbar radiculopathy M54 16                   Subjective: Pt reports 7/10 pain presently  She has been sleeping better when doing exs before going to bed  Usually feels worse as day goes on  Objective: See treatment diary below  Supine to sit: R LE increased, corrected with rot mob  SLR + R  (-) after rot mob    Precautions: none     Daily Treatment Diary         Manuals 5/3  5/8  5/15                 Lumbar PAs pmb  mb  mb                 STM R paraspinals mb  mb                    Supine rot mob   R                                           Exercise Diary                        Press ups 2x10  3x10  3x10                 Prone prop 2 min   1 min x3  1 min x3                 Posture correction x                      bike    10 min  10 min                  LTR    30  30                  SKTC    30" x3  30                  TM     10 min                                                                                                                                                                                                                                                                                                                                                                                         Modalities                                                                              Assessment: Tolerated treatment well  Patient reported no pain after session   Advised pt regarding importance of doing at REIL at least 3x/day      Plan: cont with POC

## 2018-05-22 ENCOUNTER — APPOINTMENT (OUTPATIENT)
Dept: PHYSICAL THERAPY | Facility: CLINIC | Age: 48
End: 2018-05-22
Payer: COMMERCIAL

## 2018-05-22 ENCOUNTER — TELEPHONE (OUTPATIENT)
Dept: FAMILY MEDICINE CLINIC | Facility: CLINIC | Age: 48
End: 2018-05-22

## 2018-05-22 NOTE — TELEPHONE ENCOUNTER
Call patient to confirm this is an MRI of the lumbar spine without contrast   If so it is okay to order

## 2018-05-22 NOTE — TELEPHONE ENCOUNTER
Dr Rianna Miller,    Patient is asking for a script to do MRI  Patient that this was discussion at prior visit  Please advise thank you

## 2018-05-23 ENCOUNTER — OFFICE VISIT (OUTPATIENT)
Dept: PHYSICAL THERAPY | Facility: CLINIC | Age: 48
End: 2018-05-23
Payer: COMMERCIAL

## 2018-05-23 DIAGNOSIS — M54.16 LUMBAR RADICULOPATHY: ICD-10-CM

## 2018-05-23 DIAGNOSIS — R52 PAIN: Primary | ICD-10-CM

## 2018-05-23 DIAGNOSIS — M54.16 LUMBAR RADICULITIS: Primary | ICD-10-CM

## 2018-05-23 PROCEDURE — 97110 THERAPEUTIC EXERCISES: CPT | Performed by: PHYSICAL THERAPIST

## 2018-05-23 PROCEDURE — 97140 MANUAL THERAPY 1/> REGIONS: CPT | Performed by: PHYSICAL THERAPIST

## 2018-05-23 NOTE — PROGRESS NOTES
Daily Note     Today's date: 2018  Patient name: Em Blanco  : 1970  MRN: [id number]  Referring provider: Judy Villanueva DO  Dx:   Encounter Diagnosis     ICD-10-CM    1  Lumbar radiculitis M54 16    2  Lumbar radiculopathy M54 16                   Subjective: Pt reports that she has significant pain today  Was working at home, doing a lot of bending and lifting  7/10 pain  Had pain today getting up from sofa    Objective: See treatment diary below    Precautions: none  Reviewed sitting posture, advised to avoid sitting on sofa  Daily Treatment Diary         Manuals 5/3  5/8  5/15  5/23               Lumbar PAs pmb  nancy cruz  mb               STM R paraspinals mb  mb    mb                Supine rot mob   R    np                                       Exercise Diary                        Press ups 2x10  3x10  3x10  3x10               Prone prop 2 min   1 min x3  1 min x3  1 min               Posture correction x      x                bike    10 min  10 min                  LTR    30  30  30                SKTC    30" x3  30  30                TM     10 min                  prone alt arm and leg lift       10                                                                                                                                                                                                                                                                                                                                                               Modalities                                                                              Assessment: Tolerated treatment well  Patient reported no pain after session   Advised pt regarding importance of doing at REIL at least 3x/day      Plan: cont with POC

## 2018-05-23 NOTE — PROGRESS NOTES
Dr Monica Webber is ordering a MRI of the lumbar spine w/o contrast today due to patient is expericiencing a lot of pain

## 2018-05-24 ENCOUNTER — OFFICE VISIT (OUTPATIENT)
Dept: PHYSICAL THERAPY | Facility: CLINIC | Age: 48
End: 2018-05-24
Payer: COMMERCIAL

## 2018-05-24 DIAGNOSIS — M54.16 LUMBAR RADICULITIS: Primary | ICD-10-CM

## 2018-05-24 DIAGNOSIS — M54.16 LUMBAR RADICULOPATHY: ICD-10-CM

## 2018-05-24 PROCEDURE — 97010 HOT OR COLD PACKS THERAPY: CPT | Performed by: PHYSICAL THERAPIST

## 2018-05-24 PROCEDURE — 97110 THERAPEUTIC EXERCISES: CPT | Performed by: PHYSICAL THERAPIST

## 2018-05-24 PROCEDURE — 97140 MANUAL THERAPY 1/> REGIONS: CPT | Performed by: PHYSICAL THERAPIST

## 2018-05-24 NOTE — PROGRESS NOTES
Daily Note     Today's date: 2018  Patient name: Andrew Ruelas  : 1970  MRN: [id number]  Referring provider: Nabor Sexton DO  Dx:   Encounter Diagnosis     ICD-10-CM    1  Lumbar radiculitis M54 16    2  Lumbar radiculopathy M54 16                   Subjective: Pt reports that her back felt a little better after last session  Had field day at school today wo back is now sore    Objective: See treatment diary below    Precautions: none    Daily Treatment Diary         Manuals 5/3  5/8  5/15  5/23  5/24             Lumbar PAs pmb  mb  mb  mb  mb             STM R paraspinals mb  nancy cruz  mb              Supine rot mob   R    np                                       Exercise Diary                        Press ups 2x10  3x10  3x10  3x10  3x10             Prone prop 2 min   1 min x3  1 min x3  1 min  1 min x3             Posture correction x      x                bike    10 min  10 min                  LTR    30  30  30                SKTC    30" x3  30  30  30"x3              TM     10 min                  prone alt arm and leg lift       10  2x10              abdominal hollow       10"x10 10"x20                                                                                                                                                                                                                                                                                                                                     Modalities                                                                              Assessment: Tolerated treatment well  Patient reported no pain after session   Advised pt regarding importance of doing at REIL at least 3x/day      Plan: cont with POC

## 2018-05-26 DIAGNOSIS — M54.16 LUMBAR RADICULITIS: ICD-10-CM

## 2018-05-27 RX ORDER — MELOXICAM 15 MG/1
15 TABLET ORAL DAILY
Qty: 30 TABLET | Refills: 0 | Status: SHIPPED | OUTPATIENT
Start: 2018-05-27 | End: 2018-09-12

## 2018-05-29 ENCOUNTER — OFFICE VISIT (OUTPATIENT)
Dept: PHYSICAL THERAPY | Facility: CLINIC | Age: 48
End: 2018-05-29
Payer: COMMERCIAL

## 2018-05-29 DIAGNOSIS — M54.16 LUMBAR RADICULOPATHY: ICD-10-CM

## 2018-05-29 DIAGNOSIS — M54.16 LUMBAR RADICULITIS: Primary | ICD-10-CM

## 2018-05-29 PROCEDURE — 97110 THERAPEUTIC EXERCISES: CPT | Performed by: PHYSICAL THERAPIST

## 2018-05-29 PROCEDURE — 97140 MANUAL THERAPY 1/> REGIONS: CPT | Performed by: PHYSICAL THERAPIST

## 2018-05-29 NOTE — PROGRESS NOTES
Daily Note     Today's date: 2018  Patient name: Sixto Torres  : 1970  MRN: [id number]  Referring provider: Rachell Gomez DO  Dx:   Encounter Diagnosis     ICD-10-CM    1  Lumbar radiculitis M54 16    2  Lumbar radiculopathy M54 16                   Subjective: Pt reports that her back is feeling a little better  Objective: See treatment diary below    Precautions: none    Daily Treatment Diary         Manuals 5/3  5/8  5/15  5/23  5/24  5/29           Lumbar PAs pmb  mb  mb  mb  mb  mb           STM R paraspinals mb  mb    mb  mb  mb            Supine rot mob   R    np                                       Exercise Diary                        Press ups 2x10  3x10  3x10  3x10  3x10  3x10           Prone prop 2 min   1 min x3  1 min x3  1 min  1 min x3  x           Posture correction x      x                bike    10 min  10 min     10'            LTR    30  30  30  30  30            SKTC    30" x3  30  30  30"x3  x            TM     10 min                  prone alt arm and leg lift       10  2x10  3x10            abdominal hollow       10"x10 10"x20  10" x30            bridge with CA                        ham stretch            30"x3                                                                                                                                                                                                                                                                                   Modalities                                                                              Assessment: Tolerated treatment well       Plan: cont with POC

## 2018-05-31 ENCOUNTER — TELEPHONE (OUTPATIENT)
Dept: FAMILY MEDICINE CLINIC | Facility: CLINIC | Age: 48
End: 2018-05-31

## 2018-05-31 ENCOUNTER — OFFICE VISIT (OUTPATIENT)
Dept: PHYSICAL THERAPY | Facility: CLINIC | Age: 48
End: 2018-05-31
Payer: COMMERCIAL

## 2018-05-31 DIAGNOSIS — M54.16 LUMBAR RADICULOPATHY: ICD-10-CM

## 2018-05-31 DIAGNOSIS — M54.16 LUMBAR RADICULITIS: Primary | ICD-10-CM

## 2018-05-31 PROCEDURE — 97140 MANUAL THERAPY 1/> REGIONS: CPT | Performed by: PHYSICAL THERAPIST

## 2018-05-31 PROCEDURE — 97110 THERAPEUTIC EXERCISES: CPT | Performed by: PHYSICAL THERAPIST

## 2018-05-31 NOTE — TELEPHONE ENCOUNTER
Pt's insurance denied the authorization for Mri L-Spine that was ordered by Dr Madai Novoa  "Before the request can be approved, your doctor must provide the following notes: abnormal muscle exam (weakness, decrease in sensation or abnormal reflexes), you did back stretches (exercises from formal physical therapy, chiropractic treatments and/or supervised home exercises) for 6 weeks in the last few months, activity modification, heat/ice, medications (nsaids and/or pain) or abnormal EMG indicating spinal abnormality "   I included physical therapy notes along with our office visit notes but apparently that wasn't enough  Please advise how to proceed

## 2018-05-31 NOTE — PROGRESS NOTES
Daily Note     Today's date: 2018  Patient name: Roshan Omalley  : 1970  MRN: [id number]  Referring provider: Karishma Harry DO  Dx:   Encounter Diagnosis     ICD-10-CM    1  Lumbar radiculitis M54 16    2  Lumbar radiculopathy M54 16                   Subjective: Pt reports that today was a bad day  Back feels "really tight"  Objective: See treatment diary below    Precautions: none    Daily Treatment Diary         Manuals 5/3  5/8  5/15  5/23  5/24  5/29  5/31         Lumbar PAs pmb  mb  mb  mb  mb  mb  mb         STM R paraspinals mb  mb    mb  mb  mb  mb          Supine rot mob   R    np                                       Exercise Diary                        Press ups 2x10  3x10  3x10  3x10  3x10  3x10  3x10         Prone prop 2 min   1 min x3  1 min x3  1 min  1 min x3  x  4 min         Posture correction x      x                bike    10 min  10 min     10'  10'          LTR    30  30  30  30  30  30          SKTC    30" x3  30  30  30"x3  x  30"x3          TM     10 min                  prone alt arm and leg lift       10  2x10  3x10  3x10          abdominal hollow       10"x10 10"x20  10" x30  10"x20          bridge with CA              2x10          ham stretch            30"x3  30"x3          glute squeeze              20                                                                                                                                                                                                                                                         Modalities                                                                              Assessment: Tolerated treatment well  [place] better after session    Plan: cont with scheduled appts next week then f/u in another 2 wks

## 2018-06-01 NOTE — TELEPHONE ENCOUNTER
Please call patient to discuss that her insurance company denied her MRI  Her options are to see a specialist who may be able to reorder and getting covered, proceed with 6 weeks of physical therapy and then try reorder area, or she may discuss this with Dr Charles Staff at a follow-up visit to see if there are any other alternatives or if she has any other symptoms

## 2018-06-05 ENCOUNTER — APPOINTMENT (OUTPATIENT)
Dept: PHYSICAL THERAPY | Facility: CLINIC | Age: 48
End: 2018-06-05
Payer: COMMERCIAL

## 2018-06-07 ENCOUNTER — OFFICE VISIT (OUTPATIENT)
Dept: PHYSICAL THERAPY | Facility: CLINIC | Age: 48
End: 2018-06-07
Payer: COMMERCIAL

## 2018-06-07 DIAGNOSIS — M54.16 LUMBAR RADICULOPATHY: ICD-10-CM

## 2018-06-07 DIAGNOSIS — M54.16 LUMBAR RADICULITIS: Primary | ICD-10-CM

## 2018-06-07 PROCEDURE — 97012 MECHANICAL TRACTION THERAPY: CPT | Performed by: PHYSICAL THERAPIST

## 2018-06-07 PROCEDURE — 97110 THERAPEUTIC EXERCISES: CPT | Performed by: PHYSICAL THERAPIST

## 2018-06-07 NOTE — PROGRESS NOTES
Daily Note     Today's date: 2018  Patient name: Sixto Torres  : 1970  MRN: [id number]  Referring provider: Rachell Gomez DO  Dx:   Encounter Diagnosis     ICD-10-CM    1  Lumbar radiculitis M54 16    2  Lumbar radiculopathy M54 16                   Subjective: Pt reports continued pain      Objective: See treatment diary below  Precautions: none     Daily Treatment Diary         Manuals 5/3  5/8  5/15  5/23  5/24  5/29  5/31  6/7       Lumbar PAs pmb  mb  mb  mb  mb  mb  mb  np       STM R paraspinals mb  mb    mb  mb  mb  mb  np        Supine rot mob   R    np                                       Exercise Diary                        Press ups 2x10  3x10  3x10  3x10  3x10  3x10  3x10  3x10       Prone prop 2 min   1 min x3  1 min x3  1 min  1 min x3  x  4 min         Posture correction x      x                bike    10 min  10 min     10'  10'          LTR    30  30  30  30  30  30  x        SKTC    30" x3  30  30  30"x3  x  30"x3  x        TM     10 min                  prone alt arm and leg lift       10  2x10  3x10  3x10  x        abdominal hollow       10"x10 10"x20  10" x30  10"x20  x        bridge with CA              2x10  x        ham stretch            30"x3  30"x3  x        glute squeeze              20  x                                                                                                                                                                                                                                                       Modalities                        traction 80#,20#                15'                           Assessment: Tolerated treatment well  [place] better after session  Plan: pt is scheduled for MRI   If traction appeared to help dec symptoms we will cont with this modality, otherwise hold PT pending MRI

## 2018-06-12 ENCOUNTER — APPOINTMENT (OUTPATIENT)
Dept: PHYSICAL THERAPY | Facility: CLINIC | Age: 48
End: 2018-06-12
Payer: COMMERCIAL

## 2018-06-14 ENCOUNTER — OFFICE VISIT (OUTPATIENT)
Dept: FAMILY MEDICINE CLINIC | Facility: CLINIC | Age: 48
End: 2018-06-14
Payer: COMMERCIAL

## 2018-06-14 ENCOUNTER — APPOINTMENT (OUTPATIENT)
Dept: PHYSICAL THERAPY | Facility: CLINIC | Age: 48
End: 2018-06-14
Payer: COMMERCIAL

## 2018-06-14 VITALS — TEMPERATURE: 99.8 F | SYSTOLIC BLOOD PRESSURE: 118 MMHG | HEIGHT: 63 IN | DIASTOLIC BLOOD PRESSURE: 88 MMHG

## 2018-06-14 DIAGNOSIS — M54.16 LUMBAR RADICULITIS: Primary | ICD-10-CM

## 2018-06-14 PROCEDURE — 99213 OFFICE O/P EST LOW 20 MIN: CPT | Performed by: FAMILY MEDICINE

## 2018-06-14 RX ORDER — METHOCARBAMOL 500 MG/1
500 TABLET, FILM COATED ORAL 3 TIMES DAILY
Qty: 90 TABLET | Refills: 0 | Status: SHIPPED | OUTPATIENT
Start: 2018-06-14 | End: 2018-09-12

## 2018-06-14 NOTE — PROGRESS NOTES
Assessment/Plan:    24-year-old woman with:  Lumbar radiculitis  Discussed workup and treatment options with risks and benefits  Encouraged follow-up with Dr Ronak Coelho along with her pain doctor  Discussed heat and cold, stretching, Tylenol for mild pain and encouraged her to try the Medrol Dosepak  Will give methocarbamol as needed for breakthrough symptoms but advised the patient can take a  take with alcohol etc   Discussed supportive care return parameters  No problem-specific Assessment & Plan notes found for this encounter  Diagnoses and all orders for this visit:    Lumbar radiculitis  -     methocarbamol (ROBAXIN) 500 mg tablet; Take 1 tablet (500 mg total) by mouth 3 (three) times a day          Subjective:      Patient ID: Vanna Narvaez is a 52 y o  female  Patient is a 24-year-old woman who presents for follow-up on lumbar radiculitis  Patient admits that she continues to follow up physical therapy along with her pain doctor and is being planned for an MRI that she is doing a lot of work recently and she noticed flare on her left side  No new weakness numbness or tingling  Tolerating p o  intake  No loss of bowel or bladder control  Patient is about to begin a Medrol Dosepak prescribed by her pain doctor  Back Pain         The following portions of the patient's history were reviewed and updated as appropriate: allergies, current medications, past family history, past medical history, past social history, past surgical history and problem list     Review of Systems   Constitutional: Negative  HENT: Negative  Eyes: Negative  Respiratory: Negative  Cardiovascular: Negative  Gastrointestinal: Negative  Endocrine: Negative  Genitourinary: Negative  Musculoskeletal: Positive for back pain  Allergic/Immunologic: Negative  Neurological: Negative  Hematological: Negative  Psychiatric/Behavioral: Negative      All other systems reviewed and are negative  Objective:      /88 (BP Location: Right arm, Patient Position: Sitting, Cuff Size: Standard)   Temp 99 8 °F (37 7 °C) (Tympanic)   Ht 5' 3" (1 6 m)          Physical Exam   Constitutional: She is oriented to person, place, and time  She appears well-developed and well-nourished  HENT:   Head: Atraumatic  Right Ear: External ear normal    Left Ear: External ear normal    Eyes: Conjunctivae and EOM are normal  Pupils are equal, round, and reactive to light  Neck: Normal range of motion  Cardiovascular: Normal rate, regular rhythm and normal heart sounds  Pulmonary/Chest: Effort normal  No respiratory distress  Abdominal: Soft  Bowel sounds are normal  She exhibits no distension  There is no tenderness  There is no rebound and no guarding  Musculoskeletal: Normal range of motion  Paraspinal muscle spasm and tenderness   Neurological: She is alert and oriented to person, place, and time  No cranial nerve deficit  Skin: Skin is warm and dry  Psychiatric: She has a normal mood and affect   Her behavior is normal  Judgment and thought content normal

## 2018-06-19 ENCOUNTER — APPOINTMENT (OUTPATIENT)
Dept: PHYSICAL THERAPY | Facility: CLINIC | Age: 48
End: 2018-06-19
Payer: COMMERCIAL

## 2018-06-21 ENCOUNTER — APPOINTMENT (OUTPATIENT)
Dept: PHYSICAL THERAPY | Facility: CLINIC | Age: 48
End: 2018-06-21
Payer: COMMERCIAL

## 2018-06-26 ENCOUNTER — APPOINTMENT (OUTPATIENT)
Dept: PHYSICAL THERAPY | Facility: CLINIC | Age: 48
End: 2018-06-26
Payer: COMMERCIAL

## 2018-06-28 ENCOUNTER — APPOINTMENT (OUTPATIENT)
Dept: PHYSICAL THERAPY | Facility: CLINIC | Age: 48
End: 2018-06-28
Payer: COMMERCIAL

## 2018-07-05 ENCOUNTER — TELEPHONE (OUTPATIENT)
Dept: FAMILY MEDICINE CLINIC | Facility: CLINIC | Age: 48
End: 2018-07-05

## 2018-07-05 NOTE — TELEPHONE ENCOUNTER
Patient called requesting 6 month follow up ultrasound of right breast and 6 month follow up diagnostic mammo of right breast for [facility]

## 2018-07-25 DIAGNOSIS — F41.9 ANXIETY: ICD-10-CM

## 2018-07-26 ENCOUNTER — OFFICE VISIT (OUTPATIENT)
Dept: FAMILY MEDICINE CLINIC | Facility: CLINIC | Age: 48
End: 2018-07-26
Payer: COMMERCIAL

## 2018-07-26 VITALS
HEIGHT: 63 IN | BODY MASS INDEX: 30.83 KG/M2 | WEIGHT: 174 LBS | TEMPERATURE: 100.1 F | DIASTOLIC BLOOD PRESSURE: 80 MMHG | SYSTOLIC BLOOD PRESSURE: 113 MMHG

## 2018-07-26 DIAGNOSIS — J30.1 CHRONIC SEASONAL ALLERGIC RHINITIS DUE TO POLLEN: Primary | ICD-10-CM

## 2018-07-26 DIAGNOSIS — F32.0 CURRENT MILD EPISODE OF MAJOR DEPRESSIVE DISORDER WITHOUT PRIOR EPISODE (HCC): ICD-10-CM

## 2018-07-26 DIAGNOSIS — J30.1 NON-SEASONAL ALLERGIC RHINITIS DUE TO POLLEN: ICD-10-CM

## 2018-07-26 DIAGNOSIS — Z12.31 ENCOUNTER FOR MAMMOGRAM TO ESTABLISH BASELINE MAMMOGRAM: ICD-10-CM

## 2018-07-26 DIAGNOSIS — N63.0 BREAST MASS: ICD-10-CM

## 2018-07-26 DIAGNOSIS — F41.9 ANXIETY: ICD-10-CM

## 2018-07-26 DIAGNOSIS — J01.00 ACUTE NON-RECURRENT MAXILLARY SINUSITIS: ICD-10-CM

## 2018-07-26 PROCEDURE — 99214 OFFICE O/P EST MOD 30 MIN: CPT | Performed by: FAMILY MEDICINE

## 2018-07-26 RX ORDER — FLUTICASONE PROPIONATE 50 MCG
1 SPRAY, SUSPENSION (ML) NASAL 2 TIMES DAILY
Qty: 1 BOTTLE | Refills: 5 | Status: SHIPPED | OUTPATIENT
Start: 2018-07-26 | End: 2019-04-30 | Stop reason: SDUPTHER

## 2018-07-26 RX ORDER — CEFDINIR 300 MG/1
300 CAPSULE ORAL EVERY 12 HOURS SCHEDULED
Qty: 20 CAPSULE | Refills: 0 | Status: SHIPPED | OUTPATIENT
Start: 2018-07-26 | End: 2018-08-05

## 2018-07-26 NOTE — PROGRESS NOTES
Assessment/Plan:   patient will continue with Zoloft for depression and anxiety  Patient will continue with Flonase for allergic rhinitis  Patient will be placed on Omnicef for sinusitis  Patient will go for diagnostic mammogram and ultrasound of the right breast per routine       Diagnoses and all orders for this visit:    Chronic seasonal allergic rhinitis due to pollen  -     fluticasone (FLONASE) 50 mcg/act nasal spray; 1 spray into each nostril 2 (two) times a day    Encounter for mammogram to establish baseline mammogram  -     Mammo screening bilateral w 3d & cad; Future    Acute non-recurrent maxillary sinusitis  -     cefdinir (OMNICEF) 300 mg capsule; Take 1 capsule (300 mg total) by mouth every 12 (twelve) hours for 10 days    Current mild episode of major depressive disorder without prior episode (HCC)    Anxiety  -     sertraline (ZOLOFT) 50 mg tablet; Take 1 tablet (50 mg total) by mouth daily    Non-seasonal allergic rhinitis due to pollen    Breast mass  -     Mammo diagnostic right w cad; Future          Subjective:      Patient ID: Adelaide Tucker is a 52 y o  female  Patient is here with sinus pressure, congestion, headache, earache over the past week  Patient did have sore throat which is improved  Patient with some right ear discomfort  Patient has had some crusting from right eye also  Patient did see done as recently due to dental pain on the right maxilla  No x-rays were done which were unremarkable  No etiology dental E for pain  Patient with pain over maxillary and frontal sinuses  Patient using Tylenol sinus  Patient is using Flonase  Possible fever noted at home  The patient also with history of allergic rhinitis and uses Flonase daily  Patient also  A here to follow-up on depression  This is stable overall    Patient is refill on Zoloft        The following portions of the patient's history were reviewed and updated as appropriate: allergies, current medications, past family history, past medical history, past social history, past surgical history and problem list     Review of Systems   Constitutional: Positive for fever  HENT: Positive for congestion, postnasal drip, rhinorrhea, sinus pain, sinus pressure and sore throat  Eyes: Negative  Respiratory: Negative  Cardiovascular: Negative  Gastrointestinal: Negative  Endocrine: Negative  Genitourinary: Negative  Musculoskeletal: Negative  Skin: Negative  Allergic/Immunologic: Negative  Neurological: Negative  Hematological: Negative  Psychiatric/Behavioral: Negative  Objective:      /80 (BP Location: Left arm, Patient Position: Sitting, Cuff Size: Standard)   Temp 100 1 °F (37 8 °C)   Ht 5' 3" (1 6 m)   Wt 78 9 kg (174 lb)   BMI 30 82 kg/m²          Physical Exam   Constitutional: She is oriented to person, place, and time  She appears well-developed and well-nourished  No distress  HENT:   Head: Normocephalic  Right Ear: External ear normal    Left Ear: External ear normal    Mouth/Throat: Oropharyngeal exudate present  Eyes: EOM are normal  Pupils are equal, round, and reactive to light  Right eye exhibits no discharge  Left eye exhibits no discharge  No scleral icterus  Neck: Normal range of motion  Neck supple  No thyromegaly present  Cardiovascular: Normal rate, regular rhythm, normal heart sounds and intact distal pulses  Exam reveals no gallop and no friction rub  No murmur heard  Pulmonary/Chest: Effort normal and breath sounds normal  No respiratory distress  She has no wheezes  She has no rales  She exhibits no tenderness  Abdominal: Soft  Bowel sounds are normal  She exhibits no distension  There is no tenderness  There is no rebound and no guarding  Musculoskeletal: Normal range of motion  She exhibits no edema or tenderness  Lymphadenopathy:     She has no cervical adenopathy  Neurological: She is oriented to person, place, and time   No cranial nerve deficit  She exhibits normal muscle tone  Coordination normal    Skin: Skin is warm and dry  No rash noted  She is not diaphoretic  No erythema  No pallor  Psychiatric: She has a normal mood and affect   Her behavior is normal  Judgment and thought content normal

## 2018-07-30 ENCOUNTER — TELEPHONE (OUTPATIENT)
Dept: FAMILY MEDICINE CLINIC | Facility: CLINIC | Age: 48
End: 2018-07-30

## 2018-07-30 ENCOUNTER — TRANSCRIBE ORDERS (OUTPATIENT)
Dept: ADMINISTRATIVE | Facility: HOSPITAL | Age: 48
End: 2018-07-30

## 2018-07-30 DIAGNOSIS — N63.0 LUMP OR MASS IN BREAST: Primary | ICD-10-CM

## 2018-07-30 DIAGNOSIS — N63.0 BREAST NODULE: Primary | ICD-10-CM

## 2018-08-03 ENCOUNTER — HOSPITAL ENCOUNTER (OUTPATIENT)
Dept: MAMMOGRAPHY | Facility: CLINIC | Age: 48
Discharge: HOME | End: 2018-08-03
Payer: COMMERCIAL

## 2018-08-03 ENCOUNTER — HOSPITAL ENCOUNTER (OUTPATIENT)
Dept: ULTRASOUND IMAGING | Facility: CLINIC | Age: 48
Discharge: HOME | End: 2018-08-03
Payer: COMMERCIAL

## 2018-08-03 DIAGNOSIS — N63.0 BREAST NODULE: ICD-10-CM

## 2018-08-03 DIAGNOSIS — N63.0 BREAST MASS: ICD-10-CM

## 2018-08-03 PROCEDURE — 77065 DX MAMMO INCL CAD UNI: CPT

## 2018-08-03 PROCEDURE — 76642 ULTRASOUND BREAST LIMITED: CPT

## 2018-09-12 ENCOUNTER — OFFICE VISIT (OUTPATIENT)
Dept: FAMILY MEDICINE CLINIC | Facility: CLINIC | Age: 48
End: 2018-09-12
Payer: COMMERCIAL

## 2018-09-12 VITALS
HEIGHT: 65 IN | TEMPERATURE: 98.5 F | DIASTOLIC BLOOD PRESSURE: 84 MMHG | WEIGHT: 179.2 LBS | BODY MASS INDEX: 29.85 KG/M2 | SYSTOLIC BLOOD PRESSURE: 118 MMHG

## 2018-09-12 DIAGNOSIS — N30.00 ACUTE CYSTITIS WITHOUT HEMATURIA: ICD-10-CM

## 2018-09-12 DIAGNOSIS — R30.0 DYSURIA: Primary | ICD-10-CM

## 2018-09-12 DIAGNOSIS — R31.9 HEMATURIA, UNSPECIFIED TYPE: ICD-10-CM

## 2018-09-12 LAB
SL AMB  POCT GLUCOSE, UA: NORMAL
SL AMB LEUKOCYTE ESTERASE,UA: ABNORMAL
SL AMB POCT BILIRUBIN,UA: ABNORMAL
SL AMB POCT BLOOD,UA: ABNORMAL
SL AMB POCT CLARITY,UA: CLEAR
SL AMB POCT COLOR,UA: ABNORMAL
SL AMB POCT KETONES,UA: ABNORMAL
SL AMB POCT NITRITE,UA: ABNORMAL
SL AMB POCT PH,UA: 5
SL AMB POCT SPECIFIC GRAVITY,UA: 1.01
SL AMB POCT URINE PROTEIN: ABNORMAL
SL AMB POCT UROBILINOGEN: ABNORMAL

## 2018-09-12 PROCEDURE — 87086 URINE CULTURE/COLONY COUNT: CPT

## 2018-09-12 PROCEDURE — 81002 URINALYSIS NONAUTO W/O SCOPE: CPT | Performed by: FAMILY MEDICINE

## 2018-09-12 PROCEDURE — 3008F BODY MASS INDEX DOCD: CPT | Performed by: FAMILY MEDICINE

## 2018-09-12 PROCEDURE — 99213 OFFICE O/P EST LOW 20 MIN: CPT | Performed by: FAMILY MEDICINE

## 2018-09-12 RX ORDER — SULFAMETHOXAZOLE AND TRIMETHOPRIM 800; 160 MG/1; MG/1
1 TABLET ORAL EVERY 12 HOURS SCHEDULED
Qty: 14 TABLET | Refills: 0 | Status: SHIPPED | OUTPATIENT
Start: 2018-09-12 | End: 2018-09-19

## 2018-09-12 NOTE — PROGRESS NOTES
Assessment/Plan:   urine dip shows trace protein and leukocytes  Will send urine for culture  Patient will stay well hydrated  The patient will be put on Bactrim twice daily for 1 week  The patient will see urologist and will have ultrasound set up due to history of kidney cancer  Diagnoses and all orders for this visit:    Dysuria  -     POCT urine dip    Hematuria, unspecified type    Acute cystitis without hematuria  -     UA w Reflex to Microscopic w Reflex to Culture -Lab Collect  -     sulfamethoxazole-trimethoprim (BACTRIM DS) 800-160 mg per tablet; Take 1 tablet by mouth every 12 (twelve) hours for 7 days          Subjective:      Patient ID: Andrew Ruelas is a 52 y o  female  Patient is here with some urinary urgency and frequency along with some possible hematuria over the past 5 days  Patient has some pain in right flank  Patient with some suprapubic pressure  No fever noted  No treatment      Urinary Tract Infection    Associated symptoms include urgency  The following portions of the patient's history were reviewed and updated as appropriate: allergies, current medications, past family history, past medical history, past social history, past surgical history and problem list     Review of Systems   Constitutional: Negative  HENT: Negative  Eyes: Negative  Respiratory: Negative  Cardiovascular: Negative  Gastrointestinal: Negative  Endocrine: Negative  Genitourinary: Positive for dysuria and urgency  Musculoskeletal: Negative  Skin: Negative  Allergic/Immunologic: Negative  Neurological: Negative  Hematological: Negative  Psychiatric/Behavioral: Negative            Objective:      /84 (BP Location: Right arm, Patient Position: Sitting, Cuff Size: Large)   Temp 98 5 °F (36 9 °C) (Tympanic)   Ht 5' 4 5" (1 638 m)   Wt 81 3 kg (179 lb 3 2 oz)   LMP 08/28/2018 (Approximate)   BMI 30 28 kg/m²          Physical Exam   Cardiovascular: Normal rate and regular rhythm  Pulmonary/Chest: Effort normal and breath sounds normal    Abdominal: Soft  Bowel sounds are normal  She exhibits no distension and no mass  There is no tenderness  There is no rebound and no guarding     Right flank discomfort with palpation

## 2018-09-13 DIAGNOSIS — R30.0 DYSURIA: Primary | ICD-10-CM

## 2018-09-14 LAB — BACTERIA UR CULT: NORMAL

## 2018-10-23 ENCOUNTER — OFFICE VISIT (OUTPATIENT)
Dept: FAMILY MEDICINE CLINIC | Facility: CLINIC | Age: 48
End: 2018-10-23
Payer: COMMERCIAL

## 2018-10-23 VITALS
BODY MASS INDEX: 30.56 KG/M2 | WEIGHT: 179 LBS | DIASTOLIC BLOOD PRESSURE: 84 MMHG | HEIGHT: 64 IN | SYSTOLIC BLOOD PRESSURE: 122 MMHG | TEMPERATURE: 100.6 F

## 2018-10-23 DIAGNOSIS — J01.00 ACUTE NON-RECURRENT MAXILLARY SINUSITIS: Primary | ICD-10-CM

## 2018-10-23 DIAGNOSIS — F41.9 ANXIETY: ICD-10-CM

## 2018-10-23 PROCEDURE — 99213 OFFICE O/P EST LOW 20 MIN: CPT | Performed by: FAMILY MEDICINE

## 2018-10-23 RX ORDER — AZITHROMYCIN 250 MG/1
TABLET, FILM COATED ORAL
Qty: 6 TABLET | Refills: 0 | Status: SHIPPED | OUTPATIENT
Start: 2018-10-23 | End: 2018-10-27

## 2018-10-23 NOTE — PROGRESS NOTES
Assessment/Plan:   recommend fluids and rest along with Motrin as needed for fever  Patient use Z-Mauro  Refills on Zoloft for anxiety  Follow-up in 6 months       Diagnoses and all orders for this visit:    Acute non-recurrent maxillary sinusitis  -     azithromycin (ZITHROMAX) 250 mg tablet; Take 2 tablets today then 1 tablet daily x 4 days    Anxiety  -     sertraline (ZOLOFT) 50 mg tablet; Take 1 tablet (50 mg total) by mouth daily          Subjective:      Patient ID: Jessica Hardy is a 50 y o  female  Patient is here to follow-up on anxiety  This is stable at this time  Patient will need medications refilled  Patient also with sore throat, postnasal drip, sinus issues associated with cough and headache over the past week and half  Patient has also had fevers  No vomiting or diarrhea  Patient using Flonase and Claritin  The following portions of the patient's history were reviewed and updated as appropriate: allergies, current medications, past family history, past medical history, past social history, past surgical history and problem list     Review of Systems   Constitutional: Positive for fatigue and fever  HENT: Positive for congestion, sinus pain, sinus pressure and sore throat  Eyes: Negative  Respiratory: Positive for cough  Cardiovascular: Negative  Gastrointestinal: Negative  Endocrine: Negative  Genitourinary: Negative  Musculoskeletal: Negative  Skin: Negative  Allergic/Immunologic: Negative  Neurological: Positive for headaches  Hematological: Negative  Psychiatric/Behavioral: Negative  Objective:      /84 (BP Location: Right arm, Patient Position: Sitting, Cuff Size: Adult)   Temp (!) 100 6 °F (38 1 °C) (Tympanic)   Ht 5' 4 25" (1 632 m)   Wt 81 2 kg (179 lb)   LMP 10/09/2018 (Exact Date)   BMI 30 49 kg/m²          Physical Exam   Constitutional: She is oriented to person, place, and time   She appears well-developed and well-nourished  No distress  HENT:   Head: Normocephalic  Right Ear: External ear normal    Left Ear: External ear normal    Mouth/Throat: Oropharyngeal exudate present  Eyes: Pupils are equal, round, and reactive to light  EOM are normal  Right eye exhibits no discharge  Left eye exhibits no discharge  No scleral icterus  Neck: Normal range of motion  Neck supple  No thyromegaly present  Cardiovascular: Normal rate, regular rhythm, normal heart sounds and intact distal pulses  Exam reveals no gallop and no friction rub  No murmur heard  Pulmonary/Chest: Effort normal and breath sounds normal  No respiratory distress  She has no wheezes  She has no rales  She exhibits no tenderness  Abdominal: Soft  Bowel sounds are normal  She exhibits no distension  There is no tenderness  There is no rebound and no guarding  Musculoskeletal: Normal range of motion  She exhibits no edema or tenderness  Lymphadenopathy:     She has no cervical adenopathy  Neurological: She is oriented to person, place, and time  No cranial nerve deficit  She exhibits normal muscle tone  Coordination normal    Skin: Skin is warm and dry  No rash noted  She is not diaphoretic  No erythema  No pallor  Psychiatric: She has a normal mood and affect  Her behavior is normal  Judgment and thought content normal    Nursing note and vitals reviewed

## 2018-10-29 ENCOUNTER — OFFICE VISIT (OUTPATIENT)
Dept: FAMILY MEDICINE CLINIC | Facility: CLINIC | Age: 48
End: 2018-10-29
Payer: COMMERCIAL

## 2018-10-29 VITALS
DIASTOLIC BLOOD PRESSURE: 84 MMHG | BODY MASS INDEX: 29.76 KG/M2 | WEIGHT: 178.6 LBS | SYSTOLIC BLOOD PRESSURE: 122 MMHG | HEIGHT: 65 IN | TEMPERATURE: 99.9 F

## 2018-10-29 DIAGNOSIS — J01.00 ACUTE NON-RECURRENT MAXILLARY SINUSITIS: Primary | ICD-10-CM

## 2018-10-29 PROCEDURE — 99213 OFFICE O/P EST LOW 20 MIN: CPT | Performed by: FAMILY MEDICINE

## 2018-10-29 RX ORDER — CEFDINIR 300 MG/1
300 CAPSULE ORAL EVERY 12 HOURS SCHEDULED
Qty: 20 CAPSULE | Refills: 0 | Status: SHIPPED | OUTPATIENT
Start: 2018-10-29 | End: 2018-11-08

## 2018-10-29 NOTE — PROGRESS NOTES
Assessment/Plan:         Diagnoses and all orders for this visit:    Acute non-recurrent maxillary sinusitis  -     cefdinir (OMNICEF) 300 mg capsule; Take 1 capsule (300 mg total) by mouth every 12 (twelve) hours for 10 days          Subjective:      Patient ID: Darron Wood is a 50 y o  female  Patient follow-up on sinusitis  Patient use Z-Mauro without any improvement  Patient has used ache [employer]  Patient also with ongoing cough headache and sore throat  The following portions of the patient's history were reviewed and updated as appropriate: allergies, current medications, past family history, past medical history, past social history, past surgical history and problem list     Review of Systems   Constitutional: Negative  Negative for fever  HENT: Positive for congestion and sore throat  Eyes: Negative  Respiratory: Positive for cough  Cardiovascular: Negative  Gastrointestinal: Negative  Endocrine: Negative  Genitourinary: Negative  Musculoskeletal: Negative  Skin: Negative  Allergic/Immunologic: Negative  Neurological: Negative  Hematological: Negative  Psychiatric/Behavioral: Negative  Objective:      /84 (BP Location: Right arm, Patient Position: Sitting, Cuff Size: Adult)   Temp 99 9 °F (37 7 °C) (Tympanic)   Ht 5' 4 5" (1 638 m)   Wt 81 kg (178 lb 9 6 oz)   LMP 10/09/2018 (Exact Date)   BMI 30 18 kg/m²          Physical Exam   Constitutional: She is oriented to person, place, and time  She appears well-developed and well-nourished  No distress  HENT:   Head: Normocephalic  Right Ear: External ear normal    Left Ear: External ear normal    Mouth/Throat: Oropharyngeal exudate present  Eyes: Pupils are equal, round, and reactive to light  EOM are normal  Right eye exhibits no discharge  Left eye exhibits no discharge  No scleral icterus  Neck: Normal range of motion  Neck supple  No thyromegaly present     Cardiovascular: Normal rate, regular rhythm, normal heart sounds and intact distal pulses  Exam reveals no gallop and no friction rub  No murmur heard  Pulmonary/Chest: Effort normal and breath sounds normal  No respiratory distress  She has no wheezes  She has no rales  She exhibits no tenderness  Abdominal: Soft  Bowel sounds are normal  She exhibits no distension  There is no tenderness  There is no rebound and no guarding  Musculoskeletal: Normal range of motion  She exhibits no edema or tenderness  Lymphadenopathy:     She has no cervical adenopathy  Neurological: She is oriented to person, place, and time  No cranial nerve deficit  She exhibits normal muscle tone  Coordination normal    Skin: Skin is warm and dry  No rash noted  She is not diaphoretic  No erythema  No pallor  Psychiatric: She has a normal mood and affect  Her behavior is normal  Judgment and thought content normal    Nursing note and vitals reviewed

## 2018-11-01 DIAGNOSIS — Z91.09 ENVIRONMENTAL ALLERGIES: Primary | ICD-10-CM

## 2018-11-01 RX ORDER — MONTELUKAST SODIUM 10 MG/1
10 TABLET ORAL
Qty: 30 TABLET | Refills: 2 | Status: SHIPPED | OUTPATIENT
Start: 2018-11-01 | End: 2018-11-26

## 2018-11-01 NOTE — TELEPHONE ENCOUNTER
PT LEFT A MESSAGE STATING SHE IS ON HER 2ND ROUND OF ANTIBIOTICS AND STILL ISN'T FEELING BETTER  SHE IS WONDERING IF YOU THINK IT MIGHT BE ALLERGY RELATED    CAN YOU RECOMMEND SOMETHING FOR ALLERGIES FOR HER?

## 2018-11-01 NOTE — TELEPHONE ENCOUNTER
Pt called back stating she would like the medication sent to [facility]  Medication entered for approval from Dr Bryan Wiley

## 2018-11-26 ENCOUNTER — OFFICE VISIT (OUTPATIENT)
Dept: FAMILY MEDICINE CLINIC | Facility: CLINIC | Age: 48
End: 2018-11-26
Payer: COMMERCIAL

## 2018-11-26 VITALS
DIASTOLIC BLOOD PRESSURE: 86 MMHG | HEIGHT: 65 IN | TEMPERATURE: 97 F | SYSTOLIC BLOOD PRESSURE: 118 MMHG | BODY MASS INDEX: 29.39 KG/M2 | WEIGHT: 176.4 LBS

## 2018-11-26 DIAGNOSIS — R49.0 HOARSENESS: Primary | ICD-10-CM

## 2018-11-26 DIAGNOSIS — J30.1 SEASONAL ALLERGIC RHINITIS DUE TO POLLEN: ICD-10-CM

## 2018-11-26 DIAGNOSIS — K21.00 GASTROESOPHAGEAL REFLUX DISEASE WITH ESOPHAGITIS: ICD-10-CM

## 2018-11-26 PROCEDURE — 3008F BODY MASS INDEX DOCD: CPT | Performed by: FAMILY MEDICINE

## 2018-11-26 PROCEDURE — 99213 OFFICE O/P EST LOW 20 MIN: CPT | Performed by: FAMILY MEDICINE

## 2018-11-26 PROCEDURE — 1036F TOBACCO NON-USER: CPT | Performed by: FAMILY MEDICINE

## 2018-11-26 RX ORDER — BENZONATATE 200 MG/1
CAPSULE ORAL
Refills: 0 | COMMUNITY
Start: 2018-11-05 | End: 2018-11-26

## 2018-11-26 RX ORDER — DEXLANSOPRAZOLE 60 MG/1
60 CAPSULE, DELAYED RELEASE ORAL DAILY
Qty: 30 CAPSULE | Refills: 5 | Status: SHIPPED | OUTPATIENT
Start: 2018-11-26 | End: 2018-12-10 | Stop reason: CLARIF

## 2018-11-26 RX ORDER — DOXYCYCLINE HYCLATE 100 MG
TABLET ORAL
Refills: 0 | COMMUNITY
Start: 2018-11-05 | End: 2018-11-26

## 2018-11-26 NOTE — PROGRESS NOTES
Assessment/Plan:  The patient will continue with medication is provided by allergist   Patient presently testing next week  Patient will stop Protonix and start Dexilant  The patient will see ENT if not improving  Diagnoses and all orders for this visit:    Hoarseness  -     dexlansoprazole (DEXILANT) 60 MG capsule; Take 1 capsule (60 mg total) by mouth daily  -     Ambulatory Referral to Otolaryngology; Future    Gastroesophageal reflux disease with esophagitis  -     dexlansoprazole (DEXILANT) 60 MG capsule; Take 1 capsule (60 mg total) by mouth daily    Seasonal allergic rhinitis due to pollen    Other orders  -     Discontinue: PROAIR  (90 Base) MCG/ACT inhaler; Inhale 2 puffs every 4 (four) hours as needed  -     Discontinue: benzonatate (TESSALON) 200 MG capsule; 1 CAPSULE UP TO THREE TIMES DAILY FOR COUGH  -     Discontinue: doxycycline hyclate (VIBRA-TABS) 100 mg tablet; 1 TABLET TWICE DAILY FOR 7 DAYS          Subjective:      Patient ID: Yossi Shah is a 50 y o  female  Patient for cough and hoarseness  The patient did see the a allergist and has change medications  Patient is going for allergy testing next week  Patient is using nasal rinse also  Patient also using vaporizer  Patient feels that the Protonix is not working well for reflux symptoms  No fever noted  Patient did have sinus infection which resolved with doxycycline  Patient also had prednisone taper      Cough   Associated symptoms include postnasal drip and rhinorrhea  The following portions of the patient's history were reviewed and updated as appropriate: allergies, current medications, past family history, past medical history, past social history, past surgical history and problem list     Review of Systems   Constitutional: Negative  HENT: Positive for postnasal drip and rhinorrhea  Eyes: Negative  Respiratory: Positive for cough  Cardiovascular: Negative  Gastrointestinal: Negative  GERD   Endocrine: Negative  Genitourinary: Negative  Musculoskeletal: Negative  Skin: Negative  Allergic/Immunologic: Negative  Neurological: Negative  Hematological: Negative  Psychiatric/Behavioral: Negative  Objective:      /86 (BP Location: Right arm, Patient Position: Sitting, Cuff Size: Large)   Temp (!) 97 °F (36 1 °C) (Tympanic)   Ht 5' 4 5" (1 638 m)   Wt 80 kg (176 lb 6 4 oz)   LMP 11/26/2018   BMI 29 81 kg/m²          Physical Exam   Constitutional: She is oriented to person, place, and time  She appears well-developed and well-nourished  No distress  HENT:   Head: Normocephalic  Right Ear: External ear normal    Left Ear: External ear normal    Mouth/Throat: Oropharynx is clear and moist  No oropharyngeal exudate  Eyes: Pupils are equal, round, and reactive to light  EOM are normal  Right eye exhibits no discharge  Left eye exhibits no discharge  No scleral icterus  Neck: Normal range of motion  Neck supple  No thyromegaly present  Cardiovascular: Normal rate, regular rhythm, normal heart sounds and intact distal pulses  Exam reveals no gallop and no friction rub  No murmur heard  Pulmonary/Chest: Effort normal and breath sounds normal  No respiratory distress  She has no wheezes  She has no rales  She exhibits no tenderness  Abdominal: Soft  Bowel sounds are normal  She exhibits no distension  There is no tenderness  There is no rebound and no guarding  Musculoskeletal: Normal range of motion  She exhibits no edema or tenderness  Lymphadenopathy:     She has no cervical adenopathy  Neurological: She is oriented to person, place, and time  No cranial nerve deficit  She exhibits normal muscle tone  Coordination normal    Skin: Skin is warm and dry  No rash noted  She is not diaphoretic  No erythema  No pallor  Psychiatric: She has a normal mood and affect   Her behavior is normal  Judgment and thought content normal

## 2018-11-30 ENCOUNTER — TELEPHONE (OUTPATIENT)
Dept: FAMILY MEDICINE CLINIC | Facility: CLINIC | Age: 48
End: 2018-11-30

## 2018-11-30 NOTE — TELEPHONE ENCOUNTER
Pt's insurance required an [place] for [employer]  This [place] has been denied  The insurance requires documentation of therapeutic failure, contraindication or intolerance of a 30 day trial of 80mg/day of both omeprazole and pantoprazole prior to approval    The only documentation I found within her chart was trial/failure to Pantoprazole       [facility] notifying pharmacy and asking pt to call back)

## 2018-12-10 DIAGNOSIS — K21.9 GASTROESOPHAGEAL REFLUX DISEASE, ESOPHAGITIS PRESENCE NOT SPECIFIED: ICD-10-CM

## 2018-12-10 DIAGNOSIS — K21.00 GASTROESOPHAGEAL REFLUX DISEASE WITH ESOPHAGITIS: Primary | ICD-10-CM

## 2018-12-10 RX ORDER — OMEPRAZOLE 40 MG/1
40 CAPSULE, DELAYED RELEASE ORAL DAILY
Qty: 30 CAPSULE | Refills: 2
Start: 2018-12-10 | End: 2019-01-07 | Stop reason: SDUPTHER

## 2019-01-07 DIAGNOSIS — K21.00 GASTROESOPHAGEAL REFLUX DISEASE WITH ESOPHAGITIS: ICD-10-CM

## 2019-01-07 RX ORDER — OMEPRAZOLE 40 MG/1
40 CAPSULE, DELAYED RELEASE ORAL DAILY
Qty: 30 CAPSULE | Refills: 2 | Status: SHIPPED | OUTPATIENT
Start: 2019-01-07 | End: 2019-04-30

## 2019-01-30 DIAGNOSIS — N64.59 ABNORMAL BREAST EXAM: Primary | ICD-10-CM

## 2019-01-31 ENCOUNTER — TRANSCRIBE ORDERS (OUTPATIENT)
Dept: ADMINISTRATIVE | Facility: HOSPITAL | Age: 49
End: 2019-01-31

## 2019-01-31 DIAGNOSIS — N60.01 CYST OF RIGHT BREAST: Primary | ICD-10-CM

## 2019-02-07 ENCOUNTER — HOSPITAL ENCOUNTER (OUTPATIENT)
Dept: MAMMOGRAPHY | Facility: CLINIC | Age: 49
Discharge: HOME | End: 2019-02-07
Payer: COMMERCIAL

## 2019-02-07 ENCOUNTER — HOSPITAL ENCOUNTER (OUTPATIENT)
Dept: ULTRASOUND IMAGING | Facility: CLINIC | Age: 49
Discharge: HOME | End: 2019-02-07
Payer: COMMERCIAL

## 2019-02-07 VITALS — HEIGHT: 65 IN | WEIGHT: 176 LBS | BODY MASS INDEX: 29.32 KG/M2

## 2019-02-07 DIAGNOSIS — N60.01 CYST OF RIGHT BREAST: ICD-10-CM

## 2019-02-07 DIAGNOSIS — N64.59 ABNORMAL BREAST EXAM: ICD-10-CM

## 2019-02-07 PROCEDURE — 76642 ULTRASOUND BREAST LIMITED: CPT

## 2019-02-07 PROCEDURE — G0279 TOMOSYNTHESIS, MAMMO: HCPCS

## 2019-02-07 PROCEDURE — 77066 DX MAMMO INCL CAD BI: CPT

## 2019-03-31 DIAGNOSIS — J30.1 CHRONIC SEASONAL ALLERGIC RHINITIS DUE TO POLLEN: ICD-10-CM

## 2019-04-01 RX ORDER — FLUTICASONE PROPIONATE 50 MCG
SPRAY, SUSPENSION (ML) NASAL
Qty: 1 BOTTLE | Refills: 4 | Status: SHIPPED | OUTPATIENT
Start: 2019-04-01 | End: 2019-04-30

## 2019-04-30 ENCOUNTER — OFFICE VISIT (OUTPATIENT)
Dept: FAMILY MEDICINE CLINIC | Facility: CLINIC | Age: 49
End: 2019-04-30
Payer: COMMERCIAL

## 2019-04-30 VITALS
BODY MASS INDEX: 29.99 KG/M2 | DIASTOLIC BLOOD PRESSURE: 80 MMHG | SYSTOLIC BLOOD PRESSURE: 130 MMHG | HEIGHT: 65 IN | WEIGHT: 180 LBS | TEMPERATURE: 99.2 F

## 2019-04-30 DIAGNOSIS — J30.1 CHRONIC SEASONAL ALLERGIC RHINITIS DUE TO POLLEN: ICD-10-CM

## 2019-04-30 DIAGNOSIS — F41.9 ANXIETY: ICD-10-CM

## 2019-04-30 DIAGNOSIS — Z12.4 SCREENING FOR CERVICAL CANCER: ICD-10-CM

## 2019-04-30 DIAGNOSIS — L21.9 SEBORRHEIC DERMATITIS: ICD-10-CM

## 2019-04-30 DIAGNOSIS — R73.01 IMPAIRED FASTING GLUCOSE: ICD-10-CM

## 2019-04-30 DIAGNOSIS — K21.9 GASTROESOPHAGEAL REFLUX DISEASE, ESOPHAGITIS PRESENCE NOT SPECIFIED: Primary | ICD-10-CM

## 2019-04-30 DIAGNOSIS — M72.2 PLANTAR FASCIITIS, BILATERAL: ICD-10-CM

## 2019-04-30 DIAGNOSIS — J30.1 SEASONAL ALLERGIC RHINITIS DUE TO POLLEN: ICD-10-CM

## 2019-04-30 DIAGNOSIS — F32.0 CURRENT MILD EPISODE OF MAJOR DEPRESSIVE DISORDER WITHOUT PRIOR EPISODE (HCC): ICD-10-CM

## 2019-04-30 PROCEDURE — 3008F BODY MASS INDEX DOCD: CPT | Performed by: FAMILY MEDICINE

## 2019-04-30 PROCEDURE — 1036F TOBACCO NON-USER: CPT | Performed by: FAMILY MEDICINE

## 2019-04-30 PROCEDURE — 99214 OFFICE O/P EST MOD 30 MIN: CPT | Performed by: FAMILY MEDICINE

## 2019-04-30 RX ORDER — PANTOPRAZOLE SODIUM 40 MG/1
40 TABLET, DELAYED RELEASE ORAL DAILY
Qty: 90 TABLET | Refills: 1 | Status: SHIPPED | OUTPATIENT
Start: 2019-04-30 | End: 2019-10-30

## 2019-04-30 RX ORDER — FLUTICASONE PROPIONATE 50 MCG
1 SPRAY, SUSPENSION (ML) NASAL 2 TIMES DAILY
Qty: 3 BOTTLE | Refills: 3 | Status: SHIPPED | OUTPATIENT
Start: 2019-04-30 | End: 2019-10-30 | Stop reason: SDUPTHER

## 2019-04-30 RX ORDER — PANTOPRAZOLE SODIUM 40 MG/1
40 TABLET, DELAYED RELEASE ORAL DAILY
COMMUNITY
End: 2019-04-30 | Stop reason: SDUPTHER

## 2019-04-30 RX ORDER — SERTRALINE HYDROCHLORIDE 25 MG/1
25 TABLET, FILM COATED ORAL DAILY
Qty: 90 TABLET | Refills: 1 | Status: SHIPPED | OUTPATIENT
Start: 2019-04-30 | End: 2019-10-30 | Stop reason: SDUPTHER

## 2019-05-08 ENCOUNTER — APPOINTMENT (OUTPATIENT)
Dept: LAB | Facility: CLINIC | Age: 49
End: 2019-05-08
Payer: COMMERCIAL

## 2019-05-08 DIAGNOSIS — R73.01 IMPAIRED FASTING GLUCOSE: ICD-10-CM

## 2019-05-08 LAB
ALBUMIN SERPL BCP-MCNC: 3.7 G/DL (ref 3.5–5)
ALP SERPL-CCNC: 42 U/L (ref 46–116)
ALT SERPL W P-5'-P-CCNC: 16 U/L (ref 12–78)
ANION GAP SERPL CALCULATED.3IONS-SCNC: 4 MMOL/L (ref 4–13)
AST SERPL W P-5'-P-CCNC: 5 U/L (ref 5–45)
BASOPHILS # BLD AUTO: 0.03 THOUSANDS/ΜL (ref 0–0.1)
BASOPHILS NFR BLD AUTO: 1 % (ref 0–1)
BILIRUB SERPL-MCNC: 0.44 MG/DL (ref 0.2–1)
BUN SERPL-MCNC: 16 MG/DL (ref 5–25)
CALCIUM SERPL-MCNC: 8.6 MG/DL (ref 8.3–10.1)
CHLORIDE SERPL-SCNC: 107 MMOL/L (ref 100–108)
CHOLEST SERPL-MCNC: 203 MG/DL (ref 50–200)
CO2 SERPL-SCNC: 28 MMOL/L (ref 21–32)
CREAT SERPL-MCNC: 0.83 MG/DL (ref 0.6–1.3)
CREAT UR-MCNC: 76.6 MG/DL
EOSINOPHIL # BLD AUTO: 0.12 THOUSAND/ΜL (ref 0–0.61)
EOSINOPHIL NFR BLD AUTO: 2 % (ref 0–6)
ERYTHROCYTE [DISTWIDTH] IN BLOOD BY AUTOMATED COUNT: 13.1 % (ref 11.6–15.1)
EST. AVERAGE GLUCOSE BLD GHB EST-MCNC: 117 MG/DL
GFR SERPL CREATININE-BSD FRML MDRD: 84 ML/MIN/1.73SQ M
GLUCOSE P FAST SERPL-MCNC: 88 MG/DL (ref 65–99)
HBA1C MFR BLD: 5.7 % (ref 4.2–6.3)
HCT VFR BLD AUTO: 42.2 % (ref 34.8–46.1)
HDLC SERPL-MCNC: 43 MG/DL (ref 40–60)
HGB BLD-MCNC: 13.5 G/DL (ref 11.5–15.4)
IMM GRANULOCYTES # BLD AUTO: 0.02 THOUSAND/UL (ref 0–0.2)
IMM GRANULOCYTES NFR BLD AUTO: 0 % (ref 0–2)
LDLC SERPL CALC-MCNC: 129 MG/DL (ref 0–100)
LYMPHOCYTES # BLD AUTO: 1.21 THOUSANDS/ΜL (ref 0.6–4.47)
LYMPHOCYTES NFR BLD AUTO: 22 % (ref 14–44)
MCH RBC QN AUTO: 29 PG (ref 26.8–34.3)
MCHC RBC AUTO-ENTMCNC: 32 G/DL (ref 31.4–37.4)
MCV RBC AUTO: 91 FL (ref 82–98)
MICROALBUMIN UR-MCNC: 5.5 MG/L (ref 0–20)
MICROALBUMIN/CREAT 24H UR: 7 MG/G CREATININE (ref 0–30)
MONOCYTES # BLD AUTO: 0.43 THOUSAND/ΜL (ref 0.17–1.22)
MONOCYTES NFR BLD AUTO: 8 % (ref 4–12)
NEUTROPHILS # BLD AUTO: 3.6 THOUSANDS/ΜL (ref 1.85–7.62)
NEUTS SEG NFR BLD AUTO: 67 % (ref 43–75)
NONHDLC SERPL-MCNC: 160 MG/DL
NRBC BLD AUTO-RTO: 0 /100 WBCS
PLATELET # BLD AUTO: 261 THOUSANDS/UL (ref 149–390)
PMV BLD AUTO: 11.3 FL (ref 8.9–12.7)
POTASSIUM SERPL-SCNC: 3.8 MMOL/L (ref 3.5–5.3)
PROT SERPL-MCNC: 7.1 G/DL (ref 6.4–8.2)
RBC # BLD AUTO: 4.66 MILLION/UL (ref 3.81–5.12)
SODIUM SERPL-SCNC: 139 MMOL/L (ref 136–145)
TRIGL SERPL-MCNC: 157 MG/DL
TSH SERPL DL<=0.05 MIU/L-ACNC: 3.26 UIU/ML (ref 0.36–3.74)
WBC # BLD AUTO: 5.41 THOUSAND/UL (ref 4.31–10.16)

## 2019-05-08 PROCEDURE — 36415 COLL VENOUS BLD VENIPUNCTURE: CPT

## 2019-05-08 PROCEDURE — 83036 HEMOGLOBIN GLYCOSYLATED A1C: CPT

## 2019-05-08 PROCEDURE — 82043 UR ALBUMIN QUANTITATIVE: CPT | Performed by: FAMILY MEDICINE

## 2019-05-08 PROCEDURE — 80061 LIPID PANEL: CPT

## 2019-05-08 PROCEDURE — 80053 COMPREHEN METABOLIC PANEL: CPT

## 2019-05-08 PROCEDURE — 85025 COMPLETE CBC W/AUTO DIFF WBC: CPT

## 2019-05-08 PROCEDURE — 84443 ASSAY THYROID STIM HORMONE: CPT

## 2019-05-08 PROCEDURE — 82570 ASSAY OF URINE CREATININE: CPT | Performed by: FAMILY MEDICINE

## 2019-05-13 ENCOUNTER — TELEPHONE (OUTPATIENT)
Dept: FAMILY MEDICINE CLINIC | Facility: CLINIC | Age: 49
End: 2019-05-13

## 2019-10-30 ENCOUNTER — OFFICE VISIT (OUTPATIENT)
Dept: FAMILY MEDICINE CLINIC | Facility: CLINIC | Age: 49
End: 2019-10-30
Payer: COMMERCIAL

## 2019-10-30 VITALS
BODY MASS INDEX: 30.16 KG/M2 | DIASTOLIC BLOOD PRESSURE: 88 MMHG | HEIGHT: 65 IN | WEIGHT: 181 LBS | SYSTOLIC BLOOD PRESSURE: 132 MMHG | TEMPERATURE: 98 F

## 2019-10-30 DIAGNOSIS — J30.1 SEASONAL ALLERGIC RHINITIS DUE TO POLLEN: ICD-10-CM

## 2019-10-30 DIAGNOSIS — K21.9 GASTROESOPHAGEAL REFLUX DISEASE, ESOPHAGITIS PRESENCE NOT SPECIFIED: ICD-10-CM

## 2019-10-30 DIAGNOSIS — F32.0 CURRENT MILD EPISODE OF MAJOR DEPRESSIVE DISORDER WITHOUT PRIOR EPISODE (HCC): Primary | ICD-10-CM

## 2019-10-30 DIAGNOSIS — J30.1 CHRONIC SEASONAL ALLERGIC RHINITIS DUE TO POLLEN: ICD-10-CM

## 2019-10-30 DIAGNOSIS — F41.9 ANXIETY: ICD-10-CM

## 2019-10-30 PROCEDURE — 99214 OFFICE O/P EST MOD 30 MIN: CPT | Performed by: FAMILY MEDICINE

## 2019-10-30 PROCEDURE — 3008F BODY MASS INDEX DOCD: CPT | Performed by: FAMILY MEDICINE

## 2019-10-30 RX ORDER — FLUTICASONE PROPIONATE 50 MCG
1 SPRAY, SUSPENSION (ML) NASAL 2 TIMES DAILY
Qty: 3 BOTTLE | Refills: 3 | Status: SHIPPED | OUTPATIENT
Start: 2019-10-30 | End: 2020-11-09 | Stop reason: SDUPTHER

## 2019-10-30 RX ORDER — PANTOPRAZOLE SODIUM 40 MG/1
40 TABLET, DELAYED RELEASE ORAL DAILY
Qty: 90 TABLET | Refills: 1 | Status: CANCELLED | OUTPATIENT
Start: 2019-10-30

## 2019-10-30 RX ORDER — DEXLANSOPRAZOLE 60 MG/1
60 CAPSULE, DELAYED RELEASE ORAL DAILY
Qty: 90 CAPSULE | Refills: 1 | Status: SHIPPED | OUTPATIENT
Start: 2019-10-30 | End: 2020-11-09

## 2019-10-30 NOTE — PROGRESS NOTES
Assessment/Plan:  Patient will continue with Flonase and Astelin nasal spray as needed for allergies  Patient will stop Protonix and start Dexilant as directed  Patient may use over-the-counter Claritin or Zyrtec  Patient will continue with current regimen for anxiety/depression  Follow-up in 6 months       Diagnoses and all orders for this visit:    Current mild episode of major depressive disorder without prior episode (HCC)    Chronic seasonal allergic rhinitis due to pollen  -     fluticasone (FLONASE) 50 mcg/act nasal spray; 1 spray into each nostril 2 (two) times a day    Gastroesophageal reflux disease, esophagitis presence not specified  -     dexlansoprazole (DEXILANT) 60 MG capsule; Take 1 capsule (60 mg total) by mouth daily    Anxiety  -     sertraline (ZOLOFT) 50 mg tablet; Take 1 tablet (50 mg total) by mouth daily    Seasonal allergic rhinitis due to pollen    Other orders  -     Cancel: pantoprazole (PROTONIX) 40 mg tablet; Take 1 tablet (40 mg total) by mouth daily            Subjective:        Patient ID: Cameron Andujar is a 52 y o  female  Patient follow-up on depression as well as GERD and allergies  Patient with increased belching and abdominal issues  Patient with some allergic symptoms at this time  Depression stable overall  Patient is on Zoloft 50 mg daily        The following portions of the patient's history were reviewed and updated as appropriate: allergies, current medications, past family history, past medical history, past social history, past surgical history and problem list       Review of Systems   Constitutional: Negative  Negative for fever  HENT: Positive for congestion, postnasal drip and rhinorrhea  Eyes: Negative  Respiratory: Negative  Cardiovascular: Negative  Gastrointestinal:        GERD symptoms   Endocrine: Negative  Genitourinary: Negative  Musculoskeletal: Negative  Skin: Negative  Allergic/Immunologic: Negative  Neurological: Negative  Hematological: Negative  Psychiatric/Behavioral: Negative  Objective:      BMI Counseling: Body mass index is 30 59 kg/m²  Discussed the patient's BMI with her  The BMI is above normal  Nutrition recommendations include decreasing overall calorie intake  Depression Screening Follow-up Plan: Patient's depression screening was positive with a PHQ-2 score of   Their PHQ-9 score was   Clinically patient does not have depression  No treatment is required  /88 (BP Location: Right arm, Patient Position: Sitting, Cuff Size: Adult)   Temp 98 °F (36 7 °C) (Tympanic)   Ht 5' 4 5" (1 638 m)   Wt 82 1 kg (181 lb)   LMP 10/22/2019 (Exact Date)   BMI 30 59 kg/m²          Physical Exam   Constitutional: She appears well-developed and well-nourished  No distress  HENT:   Head: Normocephalic  Right Ear: External ear normal    Left Ear: External ear normal    Mouth/Throat: Oropharyngeal exudate present  Eyes: Pupils are equal, round, and reactive to light  EOM are normal  Right eye exhibits no discharge  Left eye exhibits no discharge  No scleral icterus  Neck: Normal range of motion  Neck supple  No thyromegaly present  Cardiovascular: Normal rate, regular rhythm, normal heart sounds and intact distal pulses  Exam reveals no gallop and no friction rub  No murmur heard  Pulmonary/Chest: Effort normal and breath sounds normal  No respiratory distress  She has no wheezes  She has no rales  She exhibits no tenderness  Abdominal: Soft  Bowel sounds are normal  She exhibits no distension  There is no tenderness  There is no rebound and no guarding  Musculoskeletal: Normal range of motion  She exhibits no edema or tenderness  Lymphadenopathy:     She has no cervical adenopathy  Neurological: She is alert  No cranial nerve deficit  She exhibits normal muscle tone  Coordination normal    Skin: Skin is warm and dry  No rash noted  She is not diaphoretic   No erythema  No pallor  Psychiatric: She has a normal mood and affect  Her behavior is normal  Judgment and thought content normal    Nursing note and vitals reviewed

## 2020-01-07 DIAGNOSIS — L21.9 SEBORRHEIC DERMATITIS: ICD-10-CM

## 2020-01-07 DIAGNOSIS — K21.00 GASTROESOPHAGEAL REFLUX DISEASE WITH ESOPHAGITIS: Primary | ICD-10-CM

## 2020-01-07 RX ORDER — PANTOPRAZOLE SODIUM 40 MG/1
40 TABLET, DELAYED RELEASE ORAL DAILY
Qty: 90 TABLET | Refills: 0 | Status: SHIPPED | OUTPATIENT
Start: 2020-01-07 | End: 2020-04-02

## 2020-01-07 NOTE — TELEPHONE ENCOUNTER
Patient is requesting refill of pantoprazole and nystatin to go to [facility]  Patient was seen in October 2019  Have placed orders for approval  Thank you

## 2020-04-02 DIAGNOSIS — K21.00 GASTROESOPHAGEAL REFLUX DISEASE WITH ESOPHAGITIS: ICD-10-CM

## 2020-04-02 RX ORDER — PANTOPRAZOLE SODIUM 40 MG/1
TABLET, DELAYED RELEASE ORAL
Qty: 90 TABLET | Refills: 0 | Status: SHIPPED | OUTPATIENT
Start: 2020-04-02 | End: 2020-07-15

## 2020-06-05 ENCOUNTER — TRANSCRIBE ORDERS (OUTPATIENT)
Dept: ADMINISTRATIVE | Facility: HOSPITAL | Age: 50
End: 2020-06-05

## 2020-06-05 DIAGNOSIS — Z12.31 ENCOUNTER FOR SCREENING MAMMOGRAM FOR MALIGNANT NEOPLASM OF BREAST: Primary | ICD-10-CM

## 2020-07-01 ENCOUNTER — HOSPITAL ENCOUNTER (OUTPATIENT)
Dept: BONE DENSITY | Facility: IMAGING CENTER | Age: 50
Discharge: HOME | End: 2020-07-01
Payer: COMMERCIAL

## 2020-07-01 ENCOUNTER — TELEPHONE (OUTPATIENT)
Dept: UROLOGY | Facility: MEDICAL CENTER | Age: 50
End: 2020-07-01

## 2020-07-01 VITALS — BODY MASS INDEX: 29.66 KG/M2 | HEIGHT: 65 IN | WEIGHT: 178 LBS

## 2020-07-01 DIAGNOSIS — Z12.31 ENCOUNTER FOR SCREENING MAMMOGRAM FOR MALIGNANT NEOPLASM OF BREAST: ICD-10-CM

## 2020-07-01 PROCEDURE — 77063 BREAST TOMOSYNTHESIS BI: CPT

## 2020-07-01 PROCEDURE — 77067 SCR MAMMO BI INCL CAD: CPT

## 2020-07-01 NOTE — TELEPHONE ENCOUNTER
Please Triage -   New Patient- Patient was seen by Dr Amaris Franco years ago and then had care from [facility]  What is the reason for the patients appointment? Follow up for Kidney Cancer       Do we accept the patient's insurance or is the patient Self-Pay? Provider:  Marcela MEJIA  Plan:   Member ID#:   Group# :[id number]  Subscriber:   Phone#:  Location:      Has the patient had any previous urologist(s)? Yes - Dr mAaris Franco and [facility]       Have patient records been requested? No       Has the patient had any outside testing done? Care everywhere       What is the patients location preference for an office visit? [place]      Does the patient have a personal history of cancer? Yes, Shagufta Tata Franco      (If no cancer hx   ) Is the patient ok with seeing Advanced Practitioner?  No      Patient can be reached at : [phone]

## 2020-07-01 NOTE — TELEPHONE ENCOUNTER
Called pt  Appt given with Dr Roman Shadow 7/20/20  Dr Dionte Singh did her L nephrectomy 3/2003  Forwarding to Medical Records for Percy castillo

## 2020-07-10 DIAGNOSIS — F41.9 ANXIETY: ICD-10-CM

## 2020-07-15 DIAGNOSIS — K21.00 GASTROESOPHAGEAL REFLUX DISEASE WITH ESOPHAGITIS: ICD-10-CM

## 2020-07-15 RX ORDER — PANTOPRAZOLE SODIUM 40 MG/1
TABLET, DELAYED RELEASE ORAL
Qty: 90 TABLET | Refills: 0 | Status: SHIPPED | OUTPATIENT
Start: 2020-07-15 | End: 2020-11-09 | Stop reason: SDUPTHER

## 2020-07-27 NOTE — TELEPHONE ENCOUNTER
Retrieved historical medical records from [facility] and imported to [facility] for upcoming scheduled appointment 8/03/2020

## 2020-08-03 ENCOUNTER — OFFICE VISIT (OUTPATIENT)
Dept: UROLOGY | Facility: MEDICAL CENTER | Age: 50
End: 2020-08-03
Payer: COMMERCIAL

## 2020-08-03 VITALS
SYSTOLIC BLOOD PRESSURE: 120 MMHG | HEIGHT: 65 IN | BODY MASS INDEX: 31.16 KG/M2 | WEIGHT: 187 LBS | DIASTOLIC BLOOD PRESSURE: 80 MMHG | TEMPERATURE: 99.5 F | HEART RATE: 103 BPM

## 2020-08-03 DIAGNOSIS — Z85.528 HISTORY OF MALIGNANT NEOPLASM OF KIDNEY EXCLUDING RENAL PELVIS: Primary | ICD-10-CM

## 2020-08-03 DIAGNOSIS — N30.00 ACUTE CYSTITIS WITHOUT HEMATURIA: ICD-10-CM

## 2020-08-03 LAB
SL AMB  POCT GLUCOSE, UA: ABNORMAL
SL AMB LEUKOCYTE ESTERASE,UA: ABNORMAL
SL AMB POCT BILIRUBIN,UA: ABNORMAL
SL AMB POCT BLOOD,UA: ABNORMAL
SL AMB POCT CLARITY,UA: CLEAR
SL AMB POCT COLOR,UA: YELLOW
SL AMB POCT KETONES,UA: ABNORMAL
SL AMB POCT NITRITE,UA: ABNORMAL
SL AMB POCT PH,UA: 5
SL AMB POCT SPECIFIC GRAVITY,UA: 1.01
SL AMB POCT URINE PROTEIN: ABNORMAL
SL AMB POCT UROBILINOGEN: 0.2

## 2020-08-03 PROCEDURE — 81003 URINALYSIS AUTO W/O SCOPE: CPT | Performed by: UROLOGY

## 2020-08-03 PROCEDURE — 1036F TOBACCO NON-USER: CPT | Performed by: UROLOGY

## 2020-08-03 PROCEDURE — 3008F BODY MASS INDEX DOCD: CPT | Performed by: UROLOGY

## 2020-08-03 PROCEDURE — 99204 OFFICE O/P NEW MOD 45 MIN: CPT | Performed by: UROLOGY

## 2020-08-03 RX ORDER — CETIRIZINE HYDROCHLORIDE 10 MG/1
10 TABLET ORAL DAILY
COMMUNITY

## 2020-08-03 NOTE — ASSESSMENT & PLAN NOTE
No evidence of disease 17 years following radical nephrectomy  We will check renal function    I would image the kidneys only if the BUN and creatinine are abnormal   Return in 2 years

## 2020-08-03 NOTE — PROGRESS NOTES
Assessment/Plan:    History of malignant neoplasm of kidney excluding renal pelvis  No evidence of disease 17 years following radical nephrectomy  We will check renal function  I would image the kidneys only if the BUN and creatinine are abnormal   Return in 2 years       Diagnoses and all orders for this visit:    History of malignant neoplasm of kidney excluding renal pelvis  -     CBC and Platelet; Future  -     Comprehensive metabolic panel; Future    Acute cystitis without hematuria  -     POCT urine dip auto non-scope    Other orders  -     cetirizine (ZyrTEC) 10 mg tablet; Take 10 mg by mouth daily          Subjective:      Patient ID: Darron Wood is a 52 y o  female  HPI  History of renal cell carcinoma: The patient had a laparoscopic left nephrectomy for renal cell carcinoma, clear cell type on February 3, 2013  Final pathology report was pathologic stage T1, Karl nuclear grade 2 carcinoma  All margins were negative for malignancy  This was performed at [facility]   The operative report, pathology report and last visit twin member of our group have been scanned into the record  The pt was seen last on Sept 22, 2016  No dysuria or blood  Not subject to UTI's  Last imaging study was a CT in 2015 and right kidney was nl  The following portions of the patient's history were reviewed and updated as appropriate: allergies, current medications, past family history, past medical history, past social history, past surgical history and problem list     Review of Systems   Constitutional: Negative for activity change and fatigue  HENT:        Seasonal allergies  Respiratory: Negative for shortness of breath and wheezing  Cardiovascular: Negative for chest pain  Gastrointestinal: Negative for abdominal pain  GERD  Genitourinary: Negative for difficulty urinating, dysuria, frequency, hematuria and urgency     Musculoskeletal: Negative for back pain and gait problem  Skin: Negative  Allergic/Immunologic: Negative  Neurological: Negative  Psychiatric/Behavioral: Negative  Mild anxiety  Objective:      /80   Pulse 103   Temp 99 5 °F (37 5 °C)   Ht 5' 5"   Wt 84 8 kg (187 lb)   BMI 31 12 kg/m²          Physical Exam   Constitutional: She is oriented to person, place, and time  She appears well-developed  HENT:   Head: Normocephalic and atraumatic  Neck: Normal range of motion  Neck supple  Pulmonary/Chest: Effort normal    Musculoskeletal: Normal range of motion  Neurological: She is alert and oriented to person, place, and time  She has normal reflexes     Psychiatric: Her behavior is normal  Judgment and thought content normal

## 2020-08-12 ENCOUNTER — TELEPHONE (OUTPATIENT)
Dept: UROLOGY | Facility: MEDICAL CENTER | Age: 50
End: 2020-08-12

## 2020-08-12 NOTE — TELEPHONE ENCOUNTER
Left detailed message for patient to call back   CMP and CBC was normal  No communication form on file

## 2020-10-14 DIAGNOSIS — F41.9 ANXIETY: ICD-10-CM

## 2020-10-14 DIAGNOSIS — K21.00 GASTROESOPHAGEAL REFLUX DISEASE WITH ESOPHAGITIS: ICD-10-CM

## 2020-10-14 RX ORDER — PANTOPRAZOLE SODIUM 40 MG/1
TABLET, DELAYED RELEASE ORAL
Qty: 90 TABLET | Refills: 0 | OUTPATIENT
Start: 2020-10-14

## 2020-11-05 ENCOUNTER — TELEPHONE (OUTPATIENT)
Dept: FAMILY MEDICINE CLINIC | Facility: CLINIC | Age: 50
End: 2020-11-05

## 2020-11-09 ENCOUNTER — TELEMEDICINE (OUTPATIENT)
Dept: FAMILY MEDICINE CLINIC | Facility: CLINIC | Age: 50
End: 2020-11-09
Payer: COMMERCIAL

## 2020-11-09 VITALS — BODY MASS INDEX: 31.16 KG/M2 | WEIGHT: 187 LBS | HEIGHT: 65 IN

## 2020-11-09 DIAGNOSIS — F41.9 ANXIETY: ICD-10-CM

## 2020-11-09 DIAGNOSIS — J30.1 CHRONIC SEASONAL ALLERGIC RHINITIS DUE TO POLLEN: ICD-10-CM

## 2020-11-09 DIAGNOSIS — J30.1 SEASONAL ALLERGIC RHINITIS DUE TO POLLEN: ICD-10-CM

## 2020-11-09 DIAGNOSIS — K21.00 GASTROESOPHAGEAL REFLUX DISEASE WITH ESOPHAGITIS: ICD-10-CM

## 2020-11-09 DIAGNOSIS — K21.00 GASTROESOPHAGEAL REFLUX DISEASE WITH ESOPHAGITIS WITHOUT HEMORRHAGE: Primary | ICD-10-CM

## 2020-11-09 DIAGNOSIS — F32.0 CURRENT MILD EPISODE OF MAJOR DEPRESSIVE DISORDER WITHOUT PRIOR EPISODE (HCC): ICD-10-CM

## 2020-11-09 PROCEDURE — 3008F BODY MASS INDEX DOCD: CPT | Performed by: FAMILY MEDICINE

## 2020-11-09 PROCEDURE — 1036F TOBACCO NON-USER: CPT | Performed by: FAMILY MEDICINE

## 2020-11-09 PROCEDURE — 99213 OFFICE O/P EST LOW 20 MIN: CPT | Performed by: FAMILY MEDICINE

## 2020-11-09 RX ORDER — FLUTICASONE PROPIONATE 50 MCG
1 SPRAY, SUSPENSION (ML) NASAL 2 TIMES DAILY
Qty: 3 BOTTLE | Refills: 3 | Status: SHIPPED | OUTPATIENT
Start: 2020-11-09 | End: 2021-11-16

## 2020-11-09 RX ORDER — PANTOPRAZOLE SODIUM 40 MG/1
40 TABLET, DELAYED RELEASE ORAL DAILY
Qty: 90 TABLET | Refills: 1 | Status: SHIPPED | OUTPATIENT
Start: 2020-11-09 | End: 2021-05-05

## 2020-11-09 RX ORDER — ACETAMINOPHEN AND CODEINE PHOSPHATE 120; 12 MG/5ML; MG/5ML
1 SOLUTION ORAL DAILY
COMMUNITY
Start: 2020-08-21

## 2021-03-26 DIAGNOSIS — Z23 ENCOUNTER FOR IMMUNIZATION: ICD-10-CM

## 2021-05-05 DIAGNOSIS — K21.00 GASTROESOPHAGEAL REFLUX DISEASE WITH ESOPHAGITIS: ICD-10-CM

## 2021-05-05 DIAGNOSIS — F41.9 ANXIETY: ICD-10-CM

## 2021-05-05 RX ORDER — PANTOPRAZOLE SODIUM 40 MG/1
TABLET, DELAYED RELEASE ORAL
Qty: 90 TABLET | Refills: 0 | Status: SHIPPED | OUTPATIENT
Start: 2021-05-05 | End: 2021-07-30

## 2021-07-19 PROCEDURE — 88305 TISSUE EXAM BY PATHOLOGIST: CPT | Performed by: PATHOLOGY

## 2021-07-20 ENCOUNTER — LAB REQUISITION (OUTPATIENT)
Dept: LAB | Facility: HOSPITAL | Age: 51
End: 2021-07-20
Payer: COMMERCIAL

## 2021-07-20 DIAGNOSIS — N92.0 EXCESSIVE AND FREQUENT MENSTRUATION WITH REGULAR CYCLE: ICD-10-CM

## 2021-07-29 ENCOUNTER — TELEPHONE (OUTPATIENT)
Dept: ADMINISTRATIVE | Facility: OTHER | Age: 51
End: 2021-07-29

## 2021-08-20 ENCOUNTER — HOSPITAL ENCOUNTER (OUTPATIENT)
Dept: MAMMOGRAPHY | Facility: IMAGING CENTER | Age: 51
Discharge: HOME | End: 2021-08-20
Payer: COMMERCIAL

## 2021-08-20 ENCOUNTER — TELEPHONE (OUTPATIENT)
Dept: FAMILY MEDICINE CLINIC | Facility: CLINIC | Age: 51
End: 2021-08-20

## 2021-08-20 VITALS — BODY MASS INDEX: 31.16 KG/M2 | WEIGHT: 187 LBS | HEIGHT: 65 IN

## 2021-08-20 DIAGNOSIS — Z12.31 SCREENING MAMMOGRAM, ENCOUNTER FOR: ICD-10-CM

## 2021-08-20 DIAGNOSIS — Z12.31 VISIT FOR SCREENING MAMMOGRAM: ICD-10-CM

## 2021-08-20 PROCEDURE — 77063 BREAST TOMOSYNTHESIS BI: CPT

## 2021-08-20 PROCEDURE — 77067 SCR MAMMO BI INCL CAD: CPT

## 2021-08-20 NOTE — TELEPHONE ENCOUNTER
Pt wants to know if she can increase her anxiety meds  , she is having some problems that are making her anxious

## 2021-08-23 ENCOUNTER — OFFICE VISIT (OUTPATIENT)
Dept: FAMILY MEDICINE CLINIC | Facility: CLINIC | Age: 51
End: 2021-08-23
Payer: COMMERCIAL

## 2021-08-23 VITALS
DIASTOLIC BLOOD PRESSURE: 100 MMHG | TEMPERATURE: 97.5 F | HEIGHT: 65 IN | WEIGHT: 188 LBS | SYSTOLIC BLOOD PRESSURE: 162 MMHG | BODY MASS INDEX: 31.32 KG/M2

## 2021-08-23 DIAGNOSIS — F41.9 ANXIETY: ICD-10-CM

## 2021-08-23 DIAGNOSIS — R03.0 ELEVATED BLOOD PRESSURE READING: ICD-10-CM

## 2021-08-23 DIAGNOSIS — Z12.11 SCREENING FOR COLON CANCER: Primary | ICD-10-CM

## 2021-08-23 PROCEDURE — 3008F BODY MASS INDEX DOCD: CPT | Performed by: FAMILY MEDICINE

## 2021-08-23 PROCEDURE — 1036F TOBACCO NON-USER: CPT | Performed by: FAMILY MEDICINE

## 2021-08-23 PROCEDURE — 99213 OFFICE O/P EST LOW 20 MIN: CPT | Performed by: FAMILY MEDICINE

## 2021-08-23 PROCEDURE — 3725F SCREEN DEPRESSION PERFORMED: CPT | Performed by: FAMILY MEDICINE

## 2021-08-23 NOTE — PROGRESS NOTES
Assessment/Plan:  Patient will continue with current regimen for GERD  Patient will change Zoloft to the a m  [facility] Guidance given in this regard  To consider Zoloft if symptoms do not improve  Patient use Benadryl to sleep  As needed  Patient will see therapist    Patient will follow-up in 6 weeks  Diagnoses and all orders for this visit:    Screening for colon cancer  -     Ambulatory referral for colonoscopy; Future    Anxiety    Elevated blood pressure reading            Subjective:        Patient ID: Megan Hernandez is a 48 y o  female  Patient is here for anxiety  Patient is having some hormonal issues and being worked out with gynecology  No depression or suicidal homicidal ideation  Patient's appetite is decreased  Patient is taking Zoloft at 5:00 p m  which seems to be helping  Patient had left recent laboratory studies including thyroid testing done which were normal as per the patient  The following portions of the patient's history were reviewed and updated as appropriate: allergies, current medications, past family history, past medical history, past social history, past surgical history and problem list       Review of Systems   Constitutional: Positive for appetite change  HENT: Negative  Eyes: Negative  Respiratory: Negative  Cardiovascular: Negative  Gastrointestinal: Negative  Endocrine: Negative  Genitourinary: Negative  Musculoskeletal: Negative  Skin: Negative  Allergic/Immunologic: Negative  Neurological: Negative  Hematological: Negative  Psychiatric/Behavioral: The patient is nervous/anxious  Objective:      BMI Counseling: Body mass index is 31 28 kg/m²  The BMI is above normal  Nutrition recommendations include decreasing portion sizes  Exercise recommendations include moderate physical activity 150 minutes/week                 /100 (BP Location: Right arm, Patient Position: Sitting, Cuff Size: Adult)   Temp 97 5 °F (36 4 °C) (Tympanic)   Ht 5' 5" (1 651 m)   Wt 85 3 kg (188 lb)   LMP 08/03/2021 Comment: denies pregnancy  BMI 31 28 kg/m²          Physical Exam  Vitals and nursing note reviewed  Constitutional:       General: She is not in acute distress  Appearance: Normal appearance  She is not ill-appearing, toxic-appearing or diaphoretic  HENT:      Head: Normocephalic and atraumatic  Right Ear: Tympanic membrane, ear canal and external ear normal  There is no impacted cerumen  Left Ear: Tympanic membrane, ear canal and external ear normal  There is no impacted cerumen  Nose: Nose normal  No congestion or rhinorrhea  Mouth/Throat:      Mouth: Mucous membranes are moist       Pharynx: No oropharyngeal exudate or posterior oropharyngeal erythema  Eyes:      General: No scleral icterus  Right eye: No discharge  Left eye: No discharge  Extraocular Movements: Extraocular movements intact  Conjunctiva/sclera: Conjunctivae normal       Pupils: Pupils are equal, round, and reactive to light  Neck:      Vascular: No carotid bruit  Cardiovascular:      Rate and Rhythm: Normal rate and regular rhythm  Pulses: Normal pulses  Heart sounds: Normal heart sounds  No murmur heard  No friction rub  No gallop  Pulmonary:      Effort: Pulmonary effort is normal  No respiratory distress  Breath sounds: Normal breath sounds  No stridor  No wheezing, rhonchi or rales  Chest:      Chest wall: No tenderness  Musculoskeletal:         General: No swelling, tenderness, deformity or signs of injury  Normal range of motion  Cervical back: Normal range of motion and neck supple  No rigidity  No muscular tenderness  Right lower leg: No edema  Left lower leg: No edema  Lymphadenopathy:      Cervical: No cervical adenopathy  Skin:     General: Skin is warm and dry  Capillary Refill: Capillary refill takes less than 2 seconds        Coloration: Skin is not jaundiced  Findings: No bruising, erythema, lesion or rash  Neurological:      Mental Status: She is alert and oriented to person, place, and time  Mental status is at baseline  Cranial Nerves: No cranial nerve deficit  Sensory: No sensory deficit  Motor: No weakness  Coordination: Coordination normal       Gait: Gait normal    Psychiatric:         Behavior: Behavior normal          Thought Content:  Thought content normal          Judgment: Judgment normal       Comments: Patient is anxious

## 2021-08-30 ENCOUNTER — TELEPHONE (OUTPATIENT)
Dept: FAMILY MEDICINE CLINIC | Facility: CLINIC | Age: 51
End: 2021-08-30

## 2021-08-30 DIAGNOSIS — F41.9 ANXIETY: ICD-10-CM

## 2021-08-30 RX ORDER — SERTRALINE HYDROCHLORIDE 100 MG/1
100 TABLET, FILM COATED ORAL DAILY
Qty: 90 TABLET | Refills: 1 | Status: SHIPPED | OUTPATIENT
Start: 2021-08-30 | End: 2022-03-28 | Stop reason: SDUPTHER

## 2021-08-30 NOTE — TELEPHONE ENCOUNTER
ariana called back and wants to know if she can take two 50mg zoloft until finished and what time is the best time to take this medication?

## 2021-08-31 NOTE — TELEPHONE ENCOUNTER
Yes   Patient may take Zoloft 100 mg in total daily either in the morning or at night depending on how patient feels on the medication

## 2021-09-09 DIAGNOSIS — F41.9 ANXIETY: Primary | ICD-10-CM

## 2021-09-09 RX ORDER — HYDROXYZINE HYDROCHLORIDE 25 MG/1
25 TABLET, FILM COATED ORAL EVERY 6 HOURS PRN
Qty: 30 TABLET | Refills: 2 | Status: SHIPPED | OUTPATIENT
Start: 2021-09-09

## 2021-09-09 NOTE — TELEPHONE ENCOUNTER
Patient called in stating she did have her zoloft increased to 100 mg but she just had another friend pass away from [facility] and she has been having anxiety attacks and she is wondering if there is other things she can do or take  Please review and advise  Patient very upset on the phone

## 2021-11-29 ENCOUNTER — TELEPHONE (OUTPATIENT)
Dept: ADMINISTRATIVE | Facility: OTHER | Age: 51
End: 2021-11-29

## 2022-01-18 ENCOUNTER — ANESTHESIA EVENT (OUTPATIENT)
Dept: PERIOP | Facility: HOSPITAL | Age: 52
End: 2022-01-18
Payer: COMMERCIAL

## 2022-01-25 PROCEDURE — NC001 PR NO CHARGE: Performed by: OBSTETRICS & GYNECOLOGY

## 2022-01-25 NOTE — PRE-PROCEDURE INSTRUCTIONS
Pre-Surgery Instructions:   Medication Instructions    cetirizine (ZyrTEC) 10 mg tablet Patient was instructed by Physician and understands  Do not take morning of surgery    fluticasone (FLONASE) 50 mcg/act nasal spray Instructed patient per Anesthesia Guidelines  May use morning of surgery    hydrOXYzine HCL (ATARAX) 25 mg tablet Patient was instructed by Physician and understands  Do not take morning of surgery    norethindrone (MICRONOR) 0 35 MG tablet Patient was instructed by Physician and understands  Do not take morning of surgery    pantoprazole (PROTONIX) 40 mg tablet Patient was instructed by Physician and understands  Do not take morning of surgery    sertraline (ZOLOFT) 100 mg tablet Instructed patient per Anesthesia Guidelines  Takes in evening  Do not take morning of surgery   Patient had covid 1/4/22  Fully vaccinated  Called surgeon's office to inform them of Covid surgical protocol  Reviewed showering and medication instructions  Patient verbalized understanding  Advised NPO after MN and ASC will call with scheduled surgical time

## 2022-01-27 ENCOUNTER — ANESTHESIA (OUTPATIENT)
Dept: PERIOP | Facility: HOSPITAL | Age: 52
End: 2022-01-27
Payer: COMMERCIAL

## 2022-02-14 PROCEDURE — NC001 PR NO CHARGE: Performed by: OBSTETRICS & GYNECOLOGY

## 2022-02-19 NOTE — TELEPHONE ENCOUNTER
Patient called in today for refill of Prilosec 40 mg caps and stated if she could have at least 90 supply  I placed order as 30 tab with two refills  Yes

## 2022-02-24 ENCOUNTER — HOSPITAL ENCOUNTER (OUTPATIENT)
Facility: HOSPITAL | Age: 52
Setting detail: OUTPATIENT SURGERY
Discharge: HOME | End: 2022-02-24
Attending: OBSTETRICS & GYNECOLOGY | Admitting: OBSTETRICS & GYNECOLOGY
Payer: COMMERCIAL

## 2022-02-24 VITALS
HEIGHT: 65 IN | BODY MASS INDEX: 32.43 KG/M2 | RESPIRATION RATE: 15 BRPM | DIASTOLIC BLOOD PRESSURE: 76 MMHG | SYSTOLIC BLOOD PRESSURE: 143 MMHG | TEMPERATURE: 98 F | WEIGHT: 194.67 LBS | HEART RATE: 77 BPM | OXYGEN SATURATION: 94 %

## 2022-02-24 DIAGNOSIS — Z98.890 STATUS POST ENDOMETRIAL ABLATION: Primary | ICD-10-CM

## 2022-02-24 LAB
EXT PREGNANCY TEST URINE: NEGATIVE
EXT. CONTROL: NORMAL

## 2022-02-24 PROCEDURE — 81025 URINE PREGNANCY TEST: CPT | Performed by: OBSTETRICS & GYNECOLOGY

## 2022-02-24 PROCEDURE — NC001 PR NO CHARGE: Performed by: OBSTETRICS & GYNECOLOGY

## 2022-02-24 RX ORDER — MEPERIDINE HYDROCHLORIDE 25 MG/ML
12.5 INJECTION INTRAMUSCULAR; INTRAVENOUS; SUBCUTANEOUS ONCE AS NEEDED
Status: DISCONTINUED | OUTPATIENT
Start: 2022-02-24 | End: 2022-02-24 | Stop reason: HOSPADM

## 2022-02-24 RX ORDER — HYDROMORPHONE HCL/PF 1 MG/ML
0.5 SYRINGE (ML) INJECTION
Status: DISCONTINUED | OUTPATIENT
Start: 2022-02-24 | End: 2022-02-24 | Stop reason: HOSPADM

## 2022-02-24 RX ORDER — PROPOFOL 10 MG/ML
INJECTION, EMULSION INTRAVENOUS AS NEEDED
Status: DISCONTINUED | OUTPATIENT
Start: 2022-02-24 | End: 2022-02-24

## 2022-02-24 RX ORDER — KETOROLAC TROMETHAMINE 30 MG/ML
INJECTION, SOLUTION INTRAMUSCULAR; INTRAVENOUS AS NEEDED
Status: DISCONTINUED | OUTPATIENT
Start: 2022-02-24 | End: 2022-02-24

## 2022-02-24 RX ORDER — ONDANSETRON 2 MG/ML
INJECTION INTRAMUSCULAR; INTRAVENOUS AS NEEDED
Status: DISCONTINUED | OUTPATIENT
Start: 2022-02-24 | End: 2022-02-24

## 2022-02-24 RX ORDER — FENTANYL CITRATE/PF 50 MCG/ML
50 SYRINGE (ML) INJECTION
Status: DISCONTINUED | OUTPATIENT
Start: 2022-02-24 | End: 2022-02-24 | Stop reason: HOSPADM

## 2022-02-24 RX ORDER — IBUPROFEN 600 MG/1
600 TABLET ORAL EVERY 6 HOURS PRN
Status: DISCONTINUED | OUTPATIENT
Start: 2022-02-24 | End: 2022-02-24 | Stop reason: HOSPADM

## 2022-02-24 RX ORDER — SENNOSIDES 8.6 MG
650 CAPSULE ORAL EVERY 8 HOURS PRN
Qty: 30 TABLET | Refills: 0 | Status: SHIPPED | OUTPATIENT
Start: 2022-02-24

## 2022-02-24 RX ORDER — FENTANYL CITRATE 50 UG/ML
INJECTION, SOLUTION INTRAMUSCULAR; INTRAVENOUS AS NEEDED
Status: DISCONTINUED | OUTPATIENT
Start: 2022-02-24 | End: 2022-02-24

## 2022-02-24 RX ORDER — IBUPROFEN 400 MG/1
400 TABLET ORAL EVERY 6 HOURS PRN
Qty: 30 TABLET | Refills: 0 | Status: SHIPPED | OUTPATIENT
Start: 2022-02-24

## 2022-02-24 RX ORDER — LIDOCAINE HYDROCHLORIDE 20 MG/ML
INJECTION, SOLUTION EPIDURAL; INFILTRATION; INTRACAUDAL; PERINEURAL AS NEEDED
Status: DISCONTINUED | OUTPATIENT
Start: 2022-02-24 | End: 2022-02-24

## 2022-02-24 RX ORDER — PROMETHAZINE HYDROCHLORIDE 25 MG/ML
6.25 INJECTION, SOLUTION INTRAMUSCULAR; INTRAVENOUS ONCE AS NEEDED
Status: DISCONTINUED | OUTPATIENT
Start: 2022-02-24 | End: 2022-02-24 | Stop reason: HOSPADM

## 2022-02-24 RX ORDER — EPHEDRINE SULFATE 50 MG/ML
INJECTION INTRAVENOUS AS NEEDED
Status: DISCONTINUED | OUTPATIENT
Start: 2022-02-24 | End: 2022-02-24

## 2022-02-24 RX ORDER — ONDANSETRON 2 MG/ML
4 INJECTION INTRAMUSCULAR; INTRAVENOUS ONCE AS NEEDED
Status: DISCONTINUED | OUTPATIENT
Start: 2022-02-24 | End: 2022-02-24 | Stop reason: HOSPADM

## 2022-02-24 RX ORDER — SODIUM CHLORIDE 9 MG/ML
125 INJECTION, SOLUTION INTRAVENOUS CONTINUOUS
Status: DISCONTINUED | OUTPATIENT
Start: 2022-02-24 | End: 2022-02-24 | Stop reason: HOSPADM

## 2022-02-24 RX ORDER — ACETAMINOPHEN 325 MG/1
975 TABLET ORAL EVERY 6 HOURS PRN
Status: DISCONTINUED | OUTPATIENT
Start: 2022-02-24 | End: 2022-02-24 | Stop reason: HOSPADM

## 2022-02-24 RX ORDER — MIDAZOLAM HYDROCHLORIDE 2 MG/2ML
INJECTION, SOLUTION INTRAMUSCULAR; INTRAVENOUS AS NEEDED
Status: DISCONTINUED | OUTPATIENT
Start: 2022-02-24 | End: 2022-02-24

## 2022-02-24 RX ORDER — OXYCODONE HYDROCHLORIDE 5 MG/1
5 TABLET ORAL EVERY 4 HOURS PRN
Status: DISCONTINUED | OUTPATIENT
Start: 2022-02-24 | End: 2022-02-24 | Stop reason: HOSPADM

## 2022-02-24 RX ADMIN — ONDANSETRON 4 MG: 2 INJECTION INTRAMUSCULAR; INTRAVENOUS at 17:26

## 2022-02-24 RX ADMIN — ACETAMINOPHEN 975 MG: 325 TABLET, FILM COATED ORAL at 19:08

## 2022-02-24 RX ADMIN — FENTANYL CITRATE 50 MCG: 50 INJECTION, SOLUTION INTRAMUSCULAR; INTRAVENOUS at 17:52

## 2022-02-24 RX ADMIN — PROPOFOL 200 MG: 10 INJECTION, EMULSION INTRAVENOUS at 15:52

## 2022-02-24 RX ADMIN — EPHEDRINE SULFATE 5 MG: 50 INJECTION, SOLUTION INTRAVENOUS at 16:45

## 2022-02-24 RX ADMIN — PROPOFOL 100 MG: 10 INJECTION, EMULSION INTRAVENOUS at 16:00

## 2022-02-24 RX ADMIN — KETOROLAC TROMETHAMINE 30 MG: 30 INJECTION, SOLUTION INTRAMUSCULAR at 17:26

## 2022-02-24 RX ADMIN — SODIUM CHLORIDE 125 ML/HR: 0.9 INJECTION, SOLUTION INTRAVENOUS at 14:37

## 2022-02-24 RX ADMIN — FENTANYL CITRATE 50 MCG: 50 INJECTION INTRAMUSCULAR; INTRAVENOUS at 16:55

## 2022-02-24 RX ADMIN — SODIUM CHLORIDE: 0.9 INJECTION, SOLUTION INTRAVENOUS at 16:50

## 2022-02-24 RX ADMIN — FENTANYL CITRATE 50 MCG: 50 INJECTION INTRAMUSCULAR; INTRAVENOUS at 16:06

## 2022-02-24 RX ADMIN — MIDAZOLAM 2 MG: 1 INJECTION INTRAMUSCULAR; INTRAVENOUS at 15:49

## 2022-02-24 RX ADMIN — IBUPROFEN 600 MG: 600 TABLET ORAL at 19:08

## 2022-02-24 RX ADMIN — LIDOCAINE HYDROCHLORIDE 100 MG: 20 INJECTION, SOLUTION EPIDURAL; INFILTRATION; INTRACAUDAL; PERINEURAL at 15:52

## 2022-02-24 RX ADMIN — PROPOFOL 100 MG: 10 INJECTION, EMULSION INTRAVENOUS at 15:56

## 2022-02-24 RX ADMIN — EPHEDRINE SULFATE 5 MG: 50 INJECTION, SOLUTION INTRAVENOUS at 16:18

## 2022-02-24 NOTE — ANESTHESIA POSTPROCEDURE EVALUATION
Post-Op Assessment Note    CV Status:  Stable  Pain Score: 1    Pain management: adequate     Mental Status:  Alert and awake   Hydration Status:  Euvolemic   PONV Controlled:  Controlled   Airway Patency:  Patent      Post Op Vitals Reviewed: Yes      Staff: Anesthesiologist         No complications documented      /83 (02/24/22 1816)    Temp 98 °F (36 7 °C) (02/24/22 1816)    Pulse 76 (02/24/22 1816)   Resp 20 (02/24/22 1816)    SpO2 94 % (02/24/22 1816)

## 2022-02-24 NOTE — ANESTHESIA PREPROCEDURE EVALUATION
Procedure:  D&C WITH HYSTEROSCOPY (N/A Uterus)  ABLATION ENDOMETRIAL HYDROTHERMAL (N/A Uterus)    Relevant Problems   CARDIO   (+) Hemangioma of liver      GI/HEPATIC   (+) GERD (gastroesophageal reflux disease)   (+) Hemangioma of liver      NEURO/PSYCH   (+) Anxiety   (+) Depression   (+) History of malignant neoplasm of kidney excluding renal pelvis        Physical Exam    Airway    Mallampati score: II  TM Distance: >3 FB  Neck ROM: full     Dental   No notable dental hx     Cardiovascular  Rhythm: regular, Rate: normal, Cardiovascular exam normal    Pulmonary  Pulmonary exam normal Breath sounds clear to auscultation,     Other Findings        Anesthesia Plan  ASA Score- 2     Anesthesia Type- general with ASA Monitors  Additional Monitors:   Airway Plan: LMA  Plan Factors-    Chart reviewed  Patient summary reviewed  Patient is not a current smoker  Patient instructed to abstain from smoking on day of procedure  Patient did not smoke on day of surgery  Induction- intravenous  Postoperative Plan-     Informed Consent- Anesthetic plan and risks discussed with patient and spouse

## 2022-02-24 NOTE — DISCHARGE INSTRUCTIONS
Hydrothermal Endometrial Ablation   WHAT YOU NEED TO KNOW:   Endometrial ablation is a procedure to remove the endometrium (lining of your uterus)  You may need this procedure if you have heavy or abnormal vaginal bleeding  Hydrothermal means heated fluid is used during the procedure  You will not be able to become pregnant after this procedure  Your healthcare provider will talk with you about this procedure to make sure you do not want to have children in the future  DISCHARGE INSTRUCTIONS:   Call 911 for any of the following:   · You feel lightheaded, short of breath, and have chest pain  · You cough up blood  Seek care immediately if:   · Your arm or leg feels warm, tender, and painful  It may look swollen and red  · You feel dizzy, weak, and confused  · You cannot stop vomiting  · You have severe pain  · You are not able to urinate  Contact your healthcare provider if:   · You have a fever  · You have vaginal bleeding and it is not time for your monthly period  · The bleeding during your monthly period has not decreased  · You have pain when you urinate or see blood in your urine  · You have questions or concerns about your condition or care  Medicines:   · Medicines  can help decrease pain, calm your stomach, and control vomiting  · Take your medicine as directed  Contact your healthcare provider if you think your medicine is not helping or if you have side effects  Tell him or her if you are allergic to any medicine  Keep a list of the medicines, vitamins, and herbs you take  Include the amounts, and when and why you take them  Bring the list or the pill bottles to follow-up visits  Carry your medicine list with you in case of an emergency  Activity:  Ask when you can return to your usual activities  Do not have sex or use tampons or douches for 6 weeks after your procedure, or as directed  Birth control:   You may still need to use birth control to prevent pregnancy  Pregnancy risks, such as a miscarriage and tubal pregnancy, are higher after this procedure  Talk to your healthcare provider about birth control or pregnancy after endometrial ablation  Follow up with your doctor as directed:  Write down your questions so you remember to ask them during your visits  © [employer] 2021 Information is for End User's use only and may not be sold, redistributed or otherwise used for commercial purposes  All illustrations and images included in CareNotes® are the copyrighted property of [employer]  or [facility] Abdifatah Arshad   The above information is an  only  It is not intended as medical advice for individual conditions or treatments  Talk to your doctor, nurse or pharmacist before following any medical regimen to see if it is safe and effective for you

## 2022-02-25 NOTE — OP NOTE
OPERATIVE REPORT  PATIENT NAME: Kamila Joiner    :  1970  MRN: [id number]  Pt Location: [facility] OR ROOM 06    SURGERY DATE: 2022    Surgeon(s) and Role:     Jose Wade,  - Primary     * Jaya Palumbo MD - Assisting    Preop Diagnosis:  Excessive and frequent menstruation with regular cycle [N92 0]  Excessive and frequent menstruation with irregular cycle [N92 1]    Post-Op Diagnosis Codes:     * Excessive and frequent menstruation with regular cycle [N92 0]     * Excessive and frequent menstruation with irregular cycle [N92 1]    Procedure(s) (LRB):  D&C WITH HYSTEROSCOPY (N/A)  ABLATION ENDOMETRIAL HYDROTHERMAL (N/A)    Specimen(s):  * No specimens in log *    Estimated Blood Loss:   Minimal    Drains:  * No LDAs found *    Anesthesia Type:   General    Operative Indications:  Excessive and frequent menstruation with regular cycle [N92 0]  Excessive and frequent menstruation with irregular cycle [N92 1]    Operative Findings:  1  Uterus sounded to 9 cm  2  Mildly thickened endometrium, no polyps or fibroids noted    Description of Procedure  Patient was taken to the operating room where a time out was performed to confirm correct patient and correct procedure  General LMA anesthesia (LMA) was administered and the patient was positioned on the OR table in the dorsal lithotomy position in Pardeep-type stirrups  Care was taken to avoid excess flexion and extension of the knee and hip joints bilaterally  All pressure points were padded and a abelardo hugger was placed to maintain control of core body temperature  A bimanual exam was performed and the uterus was noted to be anteverted, normal in size and consistency with no palpable adnexal masses or fullness  The patient was prepped and draped in the usual sterile fashion  Operative Technique  A straight catheter was introduced into the bladder, which was drained of 100cc of clear yellow urine   A weighted speculum was inserted into the vagina and a Isaac retractor was used to visualize the anterior lip of the cervix, which was then grasped with a single toothed tenaculum  The uterus was sounded to 9cm  The cervix was serially dilated using colt dilators for introduction of the hysteroscope  Hysteroscope was introduced under direct visualization using normal saline solution as the distention media  Hysteroscope was advanced to the uterine fundus and the entire uterine cavity was inspected in a systematic manner  There was noted to be no endometrial polyps or submucosal fibroids  A cavity integrity check was performed and no leakage noted  The hydrothermal ablation with the [facility] system was then started and temp of 90 celsius was reached  After a full 10 min of ablaion and 1 5 min of cooling the cavity was noted to be adequately blanched  The hysteroscope was withdrawn  The single toothed tenaculum was removed from the anterior lip of the cervix  Good hemostasis was confirmed at the tenaculum puncture sites  Weighted speculum was then removed from the vagina  At the conclusion of the procedure, all needle, sponge, and instrument counts were noted to be correct x2  I was present and participated in all key portions of the case  Complications:   None    I was present for the entire procedure      Patient Disposition:  PACU      SIGNATURE: José Tobias DO  DATE: February 24, 2022  TIME: 7:12 PM

## 2022-03-03 DIAGNOSIS — K21.00 GASTROESOPHAGEAL REFLUX DISEASE WITH ESOPHAGITIS: ICD-10-CM

## 2022-03-03 RX ORDER — PANTOPRAZOLE SODIUM 40 MG/1
40 TABLET, DELAYED RELEASE ORAL DAILY
Qty: 90 TABLET | Refills: 1 | Status: SHIPPED | OUTPATIENT
Start: 2022-03-03 | End: 2022-07-18 | Stop reason: SDUPTHER

## 2022-03-28 DIAGNOSIS — F41.9 ANXIETY: ICD-10-CM

## 2022-03-28 RX ORDER — SERTRALINE HYDROCHLORIDE 100 MG/1
100 TABLET, FILM COATED ORAL DAILY
Qty: 90 TABLET | Refills: 1 | Status: SHIPPED | OUTPATIENT
Start: 2022-03-28

## 2022-06-24 DIAGNOSIS — J02.9 SORE THROAT: Primary | ICD-10-CM

## 2022-06-24 RX ORDER — DOXYCYCLINE HYCLATE 100 MG/1
100 CAPSULE ORAL EVERY 12 HOURS SCHEDULED
Qty: 14 CAPSULE | Refills: 0 | Status: SHIPPED | OUTPATIENT
Start: 2022-06-24 | End: 2022-07-01

## 2022-07-18 ENCOUNTER — OFFICE VISIT (OUTPATIENT)
Dept: FAMILY MEDICINE CLINIC | Facility: CLINIC | Age: 52
End: 2022-07-18
Payer: COMMERCIAL

## 2022-07-18 DIAGNOSIS — R06.2 WHEEZING: Primary | ICD-10-CM

## 2022-07-18 DIAGNOSIS — J01.00 ACUTE NON-RECURRENT MAXILLARY SINUSITIS: ICD-10-CM

## 2022-07-18 DIAGNOSIS — J30.1 SEASONAL ALLERGIC RHINITIS DUE TO POLLEN: ICD-10-CM

## 2022-07-18 DIAGNOSIS — K21.00 GASTROESOPHAGEAL REFLUX DISEASE WITH ESOPHAGITIS WITHOUT HEMORRHAGE: ICD-10-CM

## 2022-07-18 DIAGNOSIS — K21.00 GASTROESOPHAGEAL REFLUX DISEASE WITH ESOPHAGITIS: ICD-10-CM

## 2022-07-18 PROCEDURE — 99214 OFFICE O/P EST MOD 30 MIN: CPT | Performed by: FAMILY MEDICINE

## 2022-07-18 RX ORDER — PANTOPRAZOLE SODIUM 40 MG/1
40 TABLET, DELAYED RELEASE ORAL 2 TIMES DAILY
Qty: 180 TABLET | Refills: 1
Start: 2022-07-18 | End: 2022-09-06

## 2022-07-18 RX ORDER — PREDNISONE 20 MG/1
40 TABLET ORAL DAILY
Qty: 10 TABLET | Refills: 0 | Status: SHIPPED | OUTPATIENT
Start: 2022-07-18 | End: 2022-07-23

## 2022-07-18 RX ORDER — ALBUTEROL SULFATE 90 UG/1
2 AEROSOL, METERED RESPIRATORY (INHALATION) EVERY 4 HOURS PRN
Qty: 18 G | Refills: 5 | Status: SHIPPED | OUTPATIENT
Start: 2022-07-18

## 2022-07-18 RX ORDER — CEFDINIR 300 MG/1
300 CAPSULE ORAL EVERY 12 HOURS SCHEDULED
Qty: 20 CAPSULE | Refills: 0 | Status: SHIPPED | OUTPATIENT
Start: 2022-07-18 | End: 2022-07-28

## 2022-07-18 NOTE — PROGRESS NOTES
Assessment/Plan: patient will go for colonoscopy  Patient will continue with Astelin, Flonase, Zyrtec  The patient use Omnicef as directed  Patient use albuterol as needed  Patient will start prednisone if not seeing improvement  Diagnoses and all orders for this visit:    Wheezing  -     albuterol (Ventolin HFA) 90 mcg/act inhaler; Inhale 2 puffs every 4 (four) hours as needed for wheezing  -     predniSONE 20 mg tablet; Take 2 tablets (40 mg total) by mouth daily for 5 days  -     cefdinir (OMNICEF) 300 mg capsule; Take 1 capsule (300 mg total) by mouth every 12 (twelve) hours for 10 days    Gastroesophageal reflux disease with esophagitis without hemorrhage    Gastroesophageal reflux disease with esophagitis  -     pantoprazole (PROTONIX) 40 mg tablet; Take 1 tablet (40 mg total) by mouth 2 (two) times a day    Seasonal allergic rhinitis due to pollen    Acute non-recurrent maxillary sinusitis            Subjective:        Patient ID: Karlie Garibay is a 46 y o  female  Patient is here with sore throat since June  Patient with history of GERD  Patient with negative COVID test  Patient treated with doxycycline  Patient also with cough at this time  Patient is clearing her throat  Occasional sneezing  No fevers noted  Patient with postnasal drip and some nausea associated with this  Patient with some belching  Patient did another COVID test last night which was negative  The following portions of the patient's history were reviewed and updated as appropriate: allergies, current medications, past family history, past medical history, past social history, past surgical history and problem list       Review of Systems   Constitutional: Negative  Negative for chills and fever  HENT: Positive for postnasal drip, rhinorrhea, sneezing and sore throat  Eyes: Negative  Respiratory: Positive for cough  Cardiovascular: Negative  Gastrointestinal: Negative           GERD   Endocrine: Negative  Genitourinary: Negative  Musculoskeletal: Negative  Skin: Negative  Allergic/Immunologic: Negative  Neurological: Negative  Hematological: Negative  Psychiatric/Behavioral: Negative  Objective:      BMI Counseling: There is no height or weight on file to calculate BMI  The BMI is above normal  Nutrition recommendations include decreasing portion sizes  Exercise recommendations include moderate physical activity 150 minutes/week  Rationale for BMI follow-up plan is due to patient being overweight or obese  There were no vitals taken for this visit  Physical Exam  Vitals and nursing note reviewed  Constitutional:       General: She is not in acute distress  Appearance: Normal appearance  She is not ill-appearing, toxic-appearing or diaphoretic  HENT:      Head: Normocephalic and atraumatic  Right Ear: Tympanic membrane, ear canal and external ear normal  There is no impacted cerumen  Left Ear: Tympanic membrane, ear canal and external ear normal  There is no impacted cerumen  Nose: Nose normal  No congestion or rhinorrhea  Mouth/Throat:      Mouth: Mucous membranes are moist       Pharynx: No oropharyngeal exudate or posterior oropharyngeal erythema  Eyes:      General: No scleral icterus  Right eye: No discharge  Left eye: No discharge  Extraocular Movements: Extraocular movements intact  Conjunctiva/sclera: Conjunctivae normal       Pupils: Pupils are equal, round, and reactive to light  Neck:      Vascular: No carotid bruit  Cardiovascular:      Rate and Rhythm: Normal rate and regular rhythm  Pulses: Normal pulses  Heart sounds: Normal heart sounds  No murmur heard  No friction rub  No gallop  Pulmonary:      Effort: Pulmonary effort is normal  No respiratory distress  Breath sounds: No stridor  Wheezing present  No rhonchi or rales  Chest:      Chest wall: No tenderness  Abdominal:      General: Abdomen is flat  Bowel sounds are normal  There is no distension  Palpations: Abdomen is soft  Tenderness: There is no abdominal tenderness  There is no guarding or rebound  Musculoskeletal:         General: No swelling, tenderness, deformity or signs of injury  Normal range of motion  Cervical back: Normal range of motion and neck supple  No rigidity  No muscular tenderness  Right lower leg: No edema  Left lower leg: No edema  Lymphadenopathy:      Cervical: No cervical adenopathy  Skin:     General: Skin is warm and dry  Capillary Refill: Capillary refill takes less than 2 seconds  Coloration: Skin is not jaundiced  Findings: No bruising, erythema, lesion or rash  Neurological:      General: No focal deficit present  Mental Status: She is alert and oriented to person, place, and time  Cranial Nerves: No cranial nerve deficit  Sensory: No sensory deficit  Motor: No weakness  Coordination: Coordination normal       Gait: Gait normal    Psychiatric:         Mood and Affect: Mood normal          Behavior: Behavior normal          Thought Content:  Thought content normal          Judgment: Judgment normal

## 2022-08-18 ENCOUNTER — OFFICE VISIT (OUTPATIENT)
Dept: FAMILY MEDICINE CLINIC | Facility: CLINIC | Age: 52
End: 2022-08-18
Payer: COMMERCIAL

## 2022-08-18 VITALS
DIASTOLIC BLOOD PRESSURE: 100 MMHG | BODY MASS INDEX: 32.42 KG/M2 | TEMPERATURE: 98.5 F | OXYGEN SATURATION: 99 % | HEIGHT: 65 IN | HEART RATE: 87 BPM | SYSTOLIC BLOOD PRESSURE: 134 MMHG | WEIGHT: 194.6 LBS

## 2022-08-18 DIAGNOSIS — R73.01 IMPAIRED FASTING GLUCOSE: ICD-10-CM

## 2022-08-18 DIAGNOSIS — Z00.00 WELL ADULT EXAM: Primary | ICD-10-CM

## 2022-08-18 DIAGNOSIS — Z12.12 ENCOUNTER FOR COLORECTAL CANCER SCREENING: ICD-10-CM

## 2022-08-18 DIAGNOSIS — G25.81 RESTLESS LEGS SYNDROME: ICD-10-CM

## 2022-08-18 DIAGNOSIS — Z12.11 SCREENING FOR COLON CANCER: ICD-10-CM

## 2022-08-18 DIAGNOSIS — Z12.11 ENCOUNTER FOR COLORECTAL CANCER SCREENING: ICD-10-CM

## 2022-08-18 PROCEDURE — 99396 PREV VISIT EST AGE 40-64: CPT | Performed by: FAMILY MEDICINE

## 2022-08-18 NOTE — PROGRESS NOTES
Assessment/Plan:  Patient go for laboratory studies  Patient's mammogram up-to-date  Patient is rescheduling the  Patient up-to-date with gynecologist   Patient will be referred to GI for colonoscopy  Vaccines up-to-date  To consider shingles in the future  Patient will decrease Zoloft 50 mg daily  Patient will call when refills needed  Follow-up in 6 months  Diagnoses and all orders for this visit:    Well adult exam  -     CBC and differential; Future  -     Comprehensive metabolic panel; Future  -     Lipid panel; Future  -     TSH, 3rd generation with Free T4 reflex; Future  -     Hemoglobin A1C; Future  -     Magnesium; Future    Screening for colon cancer  -     Ambulatory referral for colonoscopy; Future  -     Ambulatory referral for colonoscopy; Future    Encounter for colorectal cancer screening    Restless legs syndrome  -     CBC and differential; Future  -     Comprehensive metabolic panel; Future  -     Lipid panel; Future  -     TSH, 3rd generation with Free T4 reflex; Future  -     Hemoglobin A1C; Future  -     Magnesium; Future  -     Vitamin B12; Future  -     Ferritin; Future    Impaired fasting glucose  -     CBC and differential; Future  -     Comprehensive metabolic panel; Future  -     Lipid panel; Future  -     TSH, 3rd generation with Free T4 reflex; Future  -     Hemoglobin A1C; Future  -     Magnesium; Future            Subjective:        Patient ID: Ty Young is a 46 y o  female  Patient is here for wellness exam   Labs and vaccines reviewed  Patient had tetanus shot June 21  Patient up-to-date with gynecologic care  Patient up-to-date with mammogram   Patient due for colonoscopy  The following portions of the patient's history were reviewed and updated as appropriate: allergies, current medications, past family history, past medical history, past social history, past surgical history and problem list       Review of Systems   Constitutional: Negative  HENT: Negative  Eyes: Negative  Respiratory: Negative  Cardiovascular: Negative  Gastrointestinal: Negative  Endocrine: Negative  Genitourinary: Negative  Musculoskeletal:        Restless legs   Skin: Negative  Allergic/Immunologic: Negative  Neurological: Negative  Hematological: Negative  Psychiatric/Behavioral: Positive for sleep disturbance  Objective:               /100 (BP Location: Right arm, Patient Position: Sitting, Cuff Size: Adult)   Pulse 87   Temp 98 5 °F (36 9 °C) (Tympanic)   Ht 5' 5" (1 651 m)   Wt 88 3 kg (194 lb 9 6 oz)   SpO2 99%   BMI 32 38 kg/m²          Physical Exam  Vitals and nursing note reviewed  Constitutional:       General: She is not in acute distress  Appearance: Normal appearance  She is not ill-appearing, toxic-appearing or diaphoretic  HENT:      Head: Normocephalic and atraumatic  Right Ear: Tympanic membrane, ear canal and external ear normal  There is no impacted cerumen  Left Ear: Tympanic membrane, ear canal and external ear normal  There is no impacted cerumen  Nose: Nose normal  No congestion or rhinorrhea  Mouth/Throat:      Mouth: Mucous membranes are moist       Pharynx: No oropharyngeal exudate or posterior oropharyngeal erythema  Eyes:      General: No scleral icterus  Right eye: No discharge  Left eye: No discharge  Extraocular Movements: Extraocular movements intact  Conjunctiva/sclera: Conjunctivae normal       Pupils: Pupils are equal, round, and reactive to light  Neck:      Vascular: No carotid bruit  Cardiovascular:      Rate and Rhythm: Normal rate and regular rhythm  Pulses: Normal pulses  Heart sounds: Normal heart sounds  No murmur heard  No friction rub  No gallop  Pulmonary:      Effort: Pulmonary effort is normal  No respiratory distress  Breath sounds: Normal breath sounds  No stridor  No wheezing, rhonchi or rales  Chest:      Chest wall: No tenderness  Musculoskeletal:         General: No swelling, tenderness, deformity or signs of injury  Normal range of motion  Cervical back: Normal range of motion and neck supple  No rigidity  No muscular tenderness  Right lower leg: No edema  Left lower leg: No edema  Lymphadenopathy:      Cervical: No cervical adenopathy  Skin:     General: Skin is warm and dry  Capillary Refill: Capillary refill takes less than 2 seconds  Coloration: Skin is not jaundiced  Findings: No bruising, erythema, lesion or rash  Neurological:      General: No focal deficit present  Mental Status: She is alert and oriented to person, place, and time  Cranial Nerves: No cranial nerve deficit  Sensory: No sensory deficit  Motor: No weakness  Coordination: Coordination normal       Gait: Gait normal    Psychiatric:         Mood and Affect: Mood normal          Behavior: Behavior normal          Thought Content:  Thought content normal          Judgment: Judgment normal

## 2022-08-19 LAB — HBA1C MFR BLD HPLC: 6 %

## 2022-08-23 ENCOUNTER — VBI (OUTPATIENT)
Dept: ADMINISTRATIVE | Facility: OTHER | Age: 52
End: 2022-08-23

## 2022-10-17 PROBLEM — Z00.00 WELL ADULT EXAM: Status: RESOLVED | Noted: 2022-08-18 | Resolved: 2022-10-17

## 2022-12-21 ENCOUNTER — TELEMEDICINE (OUTPATIENT)
Dept: FAMILY MEDICINE CLINIC | Facility: CLINIC | Age: 52
End: 2022-12-21

## 2022-12-21 DIAGNOSIS — J01.00 ACUTE NON-RECURRENT MAXILLARY SINUSITIS: Primary | ICD-10-CM

## 2022-12-21 RX ORDER — METHYLPREDNISOLONE 4 MG/1
TABLET ORAL
COMMUNITY
Start: 2022-12-09

## 2022-12-21 RX ORDER — CEFDINIR 300 MG/1
300 CAPSULE ORAL EVERY 12 HOURS SCHEDULED
Qty: 20 CAPSULE | Refills: 0 | Status: SHIPPED | OUTPATIENT
Start: 2022-12-21 | End: 2022-12-31

## 2022-12-21 NOTE — PROGRESS NOTES
Assessment/Plan: Guidance given overall  Patient will use cefdinir as directed  Patient may use Sudafed  Patient will continue with Flonase  Other guidance given  Follow-up as needed  It was my intent to perform this visit via video technology but the patient was not able to do a video connection so the visit was completed via audio telephone only  Diagnoses and all orders for this visit:    Acute non-recurrent maxillary sinusitis  -     cefdinir (OMNICEF) 300 mg capsule; Take 1 capsule (300 mg total) by mouth every 12 (twelve) hours for 10 days    Other orders  -     methylPREDNISolone 4 MG tablet therapy pack; TAKE 6 TABLETS ON DAY 1 AS DIRECTED ON PACKAGE AND DECREASE BY 1 TAB EACH DAY FOR A TOTAL OF 6 DAYS            Subjective:        Patient ID: Burgess Washington is a 46 y o  female  Patient with cough, sinus pain and pressure along with postnasal drip and rhinorrhea over the past 2 weeks  Patient given prednisone at urgent care with no improvement  Patient does have fevers  Patient with dental pain associated with this  The following portions of the patient's history were reviewed and updated as appropriate: allergies, current medications, past family history, past medical history, past social history, past surgical history and problem list       Review of Systems   Constitutional: Positive for fever  HENT: Positive for dental problem, postnasal drip, rhinorrhea, sinus pressure, sinus pain and sore throat  Eyes: Negative  Respiratory: Positive for cough  Cardiovascular: Negative  Gastrointestinal: Negative  Endocrine: Negative  Genitourinary: Negative  Musculoskeletal: Negative  Skin: Negative  Allergic/Immunologic: Negative  Neurological: Negative  Hematological: Negative  Psychiatric/Behavioral: Negative  Objective: There were no vitals taken for this visit  Physical Exam  Nursing note reviewed     Neurological: Mental Status: She is alert  Psychiatric:         Mood and Affect: Mood normal          Thought Content: Thought content normal          Judgment: Judgment normal          Virtual Brief Visit    Patient is located in the following state in which I hold an active license [place]      Assessment/Plan:    Problem List Items Addressed This Visit        Respiratory    Acute non-recurrent maxillary sinusitis - Primary    Relevant Medications    cefdinir (OMNICEF) 300 mg capsule       Recent Visits  No visits were found meeting these conditions  Showing recent visits within past 7 days and meeting all other requirements  Today's Visits  Date Type Provider Dept   12/21/22 Telemedicine Alok Ho DO [facility] today's visits and meeting all other requirements  Future Appointments  No visits were found meeting these conditions    Showing future appointments within next 150 days and meeting all other requirements       Visit time 33 minutes

## 2023-01-09 DIAGNOSIS — J32.9 SINUSITIS, UNSPECIFIED CHRONICITY, UNSPECIFIED LOCATION: Primary | ICD-10-CM

## 2023-01-09 RX ORDER — DOXYCYCLINE HYCLATE 100 MG/1
100 CAPSULE ORAL EVERY 12 HOURS SCHEDULED
Qty: 20 CAPSULE | Refills: 10 | Status: CANCELLED | OUTPATIENT
Start: 2023-01-09 | End: 2023-01-19

## 2023-01-09 RX ORDER — DOXYCYCLINE HYCLATE 100 MG
100 TABLET ORAL 2 TIMES DAILY
Qty: 20 TABLET | Refills: 0 | Status: CANCELLED | OUTPATIENT
Start: 2023-01-09 | End: 2023-01-19

## 2023-01-09 RX ORDER — DOXYCYCLINE HYCLATE 100 MG
100 TABLET ORAL 2 TIMES DAILY
Qty: 20 TABLET | Refills: 0 | Status: SHIPPED | OUTPATIENT
Start: 2023-01-09 | End: 2023-01-12 | Stop reason: SDUPTHER

## 2023-01-12 DIAGNOSIS — J30.1 CHRONIC SEASONAL ALLERGIC RHINITIS DUE TO POLLEN: ICD-10-CM

## 2023-01-12 DIAGNOSIS — J32.9 SINUSITIS, UNSPECIFIED CHRONICITY, UNSPECIFIED LOCATION: ICD-10-CM

## 2023-01-12 RX ORDER — FLUTICASONE PROPIONATE 50 MCG
2 SPRAY, SUSPENSION (ML) NASAL DAILY
Qty: 48 ML | Refills: 0 | Status: SHIPPED | OUTPATIENT
Start: 2023-01-12

## 2023-01-12 RX ORDER — DOXYCYCLINE HYCLATE 100 MG
100 TABLET ORAL 2 TIMES DAILY
Qty: 20 TABLET | Refills: 0 | Status: SHIPPED | OUTPATIENT
Start: 2023-01-12 | End: 2023-01-22

## 2023-01-16 ENCOUNTER — PATIENT MESSAGE (OUTPATIENT)
Dept: FAMILY MEDICINE CLINIC | Facility: CLINIC | Age: 53
End: 2023-01-16

## 2023-01-16 DIAGNOSIS — F41.9 ANXIETY: Primary | ICD-10-CM

## 2023-01-16 DIAGNOSIS — F41.9 ANXIETY: ICD-10-CM

## 2023-01-16 RX ORDER — HYDROXYZINE HYDROCHLORIDE 10 MG/1
10 TABLET, FILM COATED ORAL EVERY 6 HOURS PRN
Qty: 120 TABLET | Refills: 0 | Status: SHIPPED | OUTPATIENT
Start: 2023-01-16 | End: 2023-01-17 | Stop reason: SDUPTHER

## 2023-01-16 NOTE — TELEPHONE ENCOUNTER
Pt has scheduled appt on 02/17/23  But pt is needing higher dose of sertraline or to beput back on hydroxyzine again   Patient is having hard time with her anxiety    Please advise

## 2023-01-17 DIAGNOSIS — F41.9 ANXIETY: ICD-10-CM

## 2023-01-17 RX ORDER — HYDROXYZINE HYDROCHLORIDE 10 MG/1
10 TABLET, FILM COATED ORAL EVERY 6 HOURS PRN
Qty: 120 TABLET | Refills: 0 | Status: CANCELLED | OUTPATIENT
Start: 2023-01-17

## 2023-01-17 RX ORDER — HYDROXYZINE HYDROCHLORIDE 10 MG/1
10 TABLET, FILM COATED ORAL EVERY 6 HOURS PRN
Qty: 120 TABLET | Refills: 0 | Status: SHIPPED | OUTPATIENT
Start: 2023-01-17 | End: 2023-01-17 | Stop reason: SDUPTHER

## 2023-01-17 RX ORDER — HYDROXYZINE HYDROCHLORIDE 10 MG/1
10 TABLET, FILM COATED ORAL EVERY 6 HOURS PRN
Qty: 120 TABLET | Refills: 0 | Status: SHIPPED | OUTPATIENT
Start: 2023-01-17

## 2023-02-03 ENCOUNTER — RA CDI HCC (OUTPATIENT)
Dept: OTHER | Facility: HOSPITAL | Age: 53
End: 2023-02-03

## 2023-02-03 NOTE — PROGRESS NOTES
[facility]  coding opportunities       Chart reviewed, no opportunity found: CHART REVIEWED, NO OPPORTUNITY FOUND        Patients Insurance        Commercial Insurance: [address] [place] [address]

## 2023-02-06 ENCOUNTER — OFFICE VISIT (OUTPATIENT)
Dept: FAMILY MEDICINE CLINIC | Facility: CLINIC | Age: 53
End: 2023-02-06

## 2023-02-06 VITALS
HEART RATE: 86 BPM | WEIGHT: 190.6 LBS | OXYGEN SATURATION: 100 % | DIASTOLIC BLOOD PRESSURE: 82 MMHG | SYSTOLIC BLOOD PRESSURE: 128 MMHG | TEMPERATURE: 99.1 F | BODY MASS INDEX: 31.75 KG/M2 | HEIGHT: 65 IN

## 2023-02-06 DIAGNOSIS — F41.9 ANXIETY: Primary | ICD-10-CM

## 2023-02-06 NOTE — PROGRESS NOTES
Assessment/Plan: Patient will be seeing counselor Wednesday  Patient will increase Zoloft to 100 mg daily  Patient will use hydroxyzine as needed  Follow-up in 3 months       Diagnoses and all orders for this visit:    Anxiety  -     sertraline (ZOLOFT) 50 mg tablet; Take 2 tablets (100 mg total) by mouth daily            Subjective:        Patient ID: Jean Claude Gage is a 46 y o  female  Patient is here to follow-up on anxiety  Patient status post dental issue  Patient completed clindamycin  Patient did see oral surgeon  Patient with cracked wisdom tooth on the left maxilla  Appetite is decreased  Patient is using probiotics  No significant other stressors  The following portions of the patient's history were reviewed and updated as appropriate: allergies, current medications, past family history, past medical history, past social history, past surgical history and problem list       Review of Systems   Constitutional: Negative  HENT: Positive for dental problem  Eyes: Negative  Respiratory: Negative  Cardiovascular: Negative  Gastrointestinal: Negative  Endocrine: Negative  Genitourinary: Negative  Musculoskeletal: Negative  Skin: Negative  Allergic/Immunologic: Negative  Neurological: Negative  Hematological: Negative  Psychiatric/Behavioral: The patient is nervous/anxious  Objective:      BMI Counseling: Body mass index is 31 72 kg/m²  The BMI is above normal  Nutrition recommendations include consuming healthier snacks  Exercise recommendations include exercising 3-5 times per week  Rationale for BMI follow-up plan is due to patient being overweight or obese               /82 (BP Location: Right arm, Patient Position: Sitting, Cuff Size: Large)   Pulse 86   Temp 99 1 °F (37 3 °C) (Temporal)   Ht 5' 5" (1 651 m)   Wt 86 5 kg (190 lb 9 6 oz)   LMP 11/20/2022 (Exact Date)   SpO2 100%   BMI 31 72 kg/m²          Physical Exam  Vitals and nursing note reviewed  Constitutional:       Appearance: Normal appearance  Cardiovascular:      Rate and Rhythm: Normal rate and regular rhythm  Pulses: Normal pulses  Heart sounds: Normal heart sounds  Skin:     Capillary Refill: Capillary refill takes less than 2 seconds  Neurological:      Mental Status: She is alert  Psychiatric:         Behavior: Behavior normal          Thought Content: Thought content normal          Judgment: Judgment normal       Comments: Anxious

## 2023-02-08 DIAGNOSIS — F41.9 ANXIETY: ICD-10-CM

## 2023-02-08 RX ORDER — HYDROXYZINE HYDROCHLORIDE 10 MG/1
TABLET, FILM COATED ORAL
Qty: 360 TABLET | Refills: 1 | Status: SHIPPED | OUTPATIENT
Start: 2023-02-08

## 2023-02-28 ENCOUNTER — HOSPITAL ENCOUNTER (OUTPATIENT)
Dept: MAMMOGRAPHY | Facility: MEDICAL CENTER | Age: 53
Discharge: HOME | End: 2023-02-28

## 2023-02-28 VITALS — WEIGHT: 190 LBS | BODY MASS INDEX: 31.65 KG/M2 | HEIGHT: 65 IN

## 2023-02-28 DIAGNOSIS — Z12.31 SCREENING MAMMOGRAM FOR HIGH-RISK PATIENT: ICD-10-CM

## 2023-03-06 ENCOUNTER — OFFICE VISIT (OUTPATIENT)
Dept: FAMILY MEDICINE CLINIC | Facility: CLINIC | Age: 53
End: 2023-03-06

## 2023-03-06 VITALS
OXYGEN SATURATION: 98 % | TEMPERATURE: 99 F | HEIGHT: 65 IN | BODY MASS INDEX: 32.09 KG/M2 | WEIGHT: 192.6 LBS | SYSTOLIC BLOOD PRESSURE: 124 MMHG | HEART RATE: 96 BPM | DIASTOLIC BLOOD PRESSURE: 88 MMHG

## 2023-03-06 DIAGNOSIS — R35.0 URINARY FREQUENCY: ICD-10-CM

## 2023-03-06 DIAGNOSIS — R35.0 URINE FREQUENCY: Primary | ICD-10-CM

## 2023-03-06 LAB
SL AMB  POCT GLUCOSE, UA: NEGATIVE
SL AMB LEUKOCYTE ESTERASE,UA: NEGATIVE
SL AMB POCT BILIRUBIN,UA: NEGATIVE
SL AMB POCT BLOOD,UA: NEGATIVE
SL AMB POCT CLARITY,UA: CLEAR
SL AMB POCT COLOR,UA: YELLOW
SL AMB POCT KETONES,UA: NEGATIVE
SL AMB POCT NITRITE,UA: NEGATIVE
SL AMB POCT PH,UA: 5
SL AMB POCT SPECIFIC GRAVITY,UA: 1
SL AMB POCT URINE PROTEIN: POSITIVE
SL AMB POCT UROBILINOGEN: NEGATIVE

## 2023-03-06 RX ORDER — SULFAMETHOXAZOLE AND TRIMETHOPRIM 800; 160 MG/1; MG/1
1 TABLET ORAL 2 TIMES DAILY
Qty: 14 TABLET | Refills: 0 | Status: SHIPPED | OUTPATIENT
Start: 2023-03-06 | End: 2023-03-13

## 2023-03-06 NOTE — PROGRESS NOTES
Assessment/Plan: Patient will increase fluids and rest appropriately  Patient will have urine sent for culture  Patient will start antibiotics as directed  Patient may consider imaging as well as referral to urology if issues persist   Guidance given in this regard  Follow-up as needed/per routine  Diagnoses and all orders for this visit:    Urine frequency  -     POCT urine dip  -     Cancel: Urine culture; Future  -     Urine culture; Future  -     Urine culture    Urinary frequency            Subjective:        Patient ID: Doyle Terrazas is a 46 y o  female  Patient is here with some urinary frequency, nocturia last week or so  Patient did try Diflucan for vaginal yeast infection with no improvement  Patient with history of renal CA 20 years ago or so  No hematuria  Patient does have some lumbar pain lower lumbar region on the right  No fevers or chills nausea vomiting associated with this  Difficulty Urinating   This is a new problem  The current episode started 1 to 4 weeks ago  The problem occurs every urination  The problem has been unchanged  The quality of the pain is described as aching and burning  The pain is at a severity of 5/10  There has been no fever  There is no history of pyelonephritis  Associated symptoms include flank pain, frequency and urgency  Pertinent negatives include no chills or possible pregnancy  The following portions of the patient's history were reviewed and updated as appropriate: allergies, current medications, past family history, past medical history, past social history, past surgical history and problem list       Review of Systems   Constitutional: Negative for chills  Genitourinary: Positive for dysuria, flank pain, frequency and urgency             Objective:               /88 (BP Location: Right arm, Patient Position: Sitting, Cuff Size: Standard)   Pulse 96   Temp 99 °F (37 2 °C) (Temporal)   Ht 5' 5" (1 651 m)   Wt 87 4 kg (192 lb 9 6 oz)   SpO2 98%   BMI 32 05 kg/m²          Physical Exam  Vitals and nursing note reviewed  Constitutional:       Appearance: Normal appearance  Abdominal:      Tenderness: There is no right CVA tenderness or left CVA tenderness  Musculoskeletal:         General: Tenderness present  Comments: Tenderness right lower lumbar region L5 region   Neurological:      Mental Status: She is alert

## 2023-03-07 LAB — BACTERIA UR CULT: NORMAL

## 2023-03-08 DIAGNOSIS — R35.0 URINARY FREQUENCY: ICD-10-CM

## 2023-03-08 DIAGNOSIS — N30.00 ACUTE CYSTITIS WITHOUT HEMATURIA: Primary | ICD-10-CM

## 2023-03-08 DIAGNOSIS — Z85.528 HISTORY OF MALIGNANT NEOPLASM OF KIDNEY EXCLUDING RENAL PELVIS: ICD-10-CM

## 2023-03-16 ENCOUNTER — HOSPITAL ENCOUNTER (OUTPATIENT)
Dept: ULTRASOUND IMAGING | Facility: HOSPITAL | Age: 53
Discharge: HOME | End: 2023-03-16

## 2023-03-16 DIAGNOSIS — N30.00 ACUTE CYSTITIS WITHOUT HEMATURIA: ICD-10-CM

## 2023-03-16 DIAGNOSIS — R35.0 URINARY FREQUENCY: ICD-10-CM

## 2023-03-16 DIAGNOSIS — Z85.528 HISTORY OF MALIGNANT NEOPLASM OF KIDNEY EXCLUDING RENAL PELVIS: ICD-10-CM

## 2023-03-21 ENCOUNTER — TELEPHONE (OUTPATIENT)
Dept: FAMILY MEDICINE CLINIC | Facility: CLINIC | Age: 53
End: 2023-03-21

## 2023-03-21 NOTE — TELEPHONE ENCOUNTER
Patient is due for annual well physical after 8/19/2023      I left a message asking patient to call back to schedule the physical

## 2023-04-02 DIAGNOSIS — F41.9 ANXIETY: ICD-10-CM

## 2023-04-07 DIAGNOSIS — F41.9 ANXIETY: ICD-10-CM

## 2023-04-07 DIAGNOSIS — K21.00 GASTROESOPHAGEAL REFLUX DISEASE WITH ESOPHAGITIS: ICD-10-CM

## 2023-04-07 RX ORDER — PANTOPRAZOLE SODIUM 40 MG/1
TABLET, DELAYED RELEASE ORAL
Qty: 30 TABLET | Refills: 5 | Status: SHIPPED | OUTPATIENT
Start: 2023-04-07

## 2023-05-15 ENCOUNTER — OFFICE VISIT (OUTPATIENT)
Dept: FAMILY MEDICINE CLINIC | Facility: CLINIC | Age: 53
End: 2023-05-15

## 2023-05-15 VITALS
DIASTOLIC BLOOD PRESSURE: 82 MMHG | HEIGHT: 65 IN | SYSTOLIC BLOOD PRESSURE: 120 MMHG | OXYGEN SATURATION: 97 % | BODY MASS INDEX: 33.09 KG/M2 | TEMPERATURE: 98 F | HEART RATE: 92 BPM | WEIGHT: 198.6 LBS

## 2023-05-15 DIAGNOSIS — J30.1 CHRONIC SEASONAL ALLERGIC RHINITIS DUE TO POLLEN: ICD-10-CM

## 2023-05-15 DIAGNOSIS — Z12.11 COLON CANCER SCREENING: ICD-10-CM

## 2023-05-15 DIAGNOSIS — F51.01 PRIMARY INSOMNIA: ICD-10-CM

## 2023-05-15 DIAGNOSIS — L70.0 ACNE VULGARIS: ICD-10-CM

## 2023-05-15 DIAGNOSIS — F32.0 CURRENT MILD EPISODE OF MAJOR DEPRESSIVE DISORDER, UNSPECIFIED WHETHER RECURRENT (HCC): ICD-10-CM

## 2023-05-15 DIAGNOSIS — T75.3XXA MOTION SICKNESS, INITIAL ENCOUNTER: ICD-10-CM

## 2023-05-15 DIAGNOSIS — F41.9 ANXIETY: ICD-10-CM

## 2023-05-15 DIAGNOSIS — J30.2 SEASONAL ALLERGIES: Primary | ICD-10-CM

## 2023-05-15 RX ORDER — FLUTICASONE PROPIONATE 50 MCG
2 SPRAY, SUSPENSION (ML) NASAL DAILY
Qty: 48 ML | Refills: 1 | Status: SHIPPED | OUTPATIENT
Start: 2023-05-15

## 2023-05-15 RX ORDER — SCOLOPAMINE TRANSDERMAL SYSTEM 1 MG/1
1 PATCH, EXTENDED RELEASE TRANSDERMAL
Qty: 4 PATCH | Refills: 0 | Status: SHIPPED | OUTPATIENT
Start: 2023-05-15

## 2023-05-15 RX ORDER — SERTRALINE HYDROCHLORIDE 100 MG/1
100 TABLET, FILM COATED ORAL DAILY
Qty: 90 TABLET | Refills: 1 | Status: SHIPPED | OUTPATIENT
Start: 2023-05-15

## 2023-05-15 NOTE — PROGRESS NOTES
Assessment/Plan: Patient use hydroxyzine for sleep  Patient remain on Zoloft 100 mg daily  Patient is complaining past for motion sickness  Patient use Flonase for allergies  Diagnoses and all orders for this visit:    Seasonal allergies  -     fluticasone (FLONASE) 50 mcg/act nasal spray; 2 sprays into each nostril daily    Colon cancer screening  -     Ambulatory referral for colonoscopy; Future    Anxiety  -     sertraline (ZOLOFT) 100 mg tablet; Take 1 tablet (100 mg total) by mouth daily    Chronic seasonal allergic rhinitis due to pollen  -     fluticasone (FLONASE) 50 mcg/act nasal spray; 2 sprays into each nostril daily    Current mild episode of major depressive disorder, unspecified whether recurrent (HCC)    Primary insomnia    Motion sickness, initial encounter  -     scopolamine (TRANSDERM-SCOP) 1 mg/3 days TD 72 hr patch; Place 1 patch on the skin over 72 hours every third day    Acne vulgaris  -     tretinoin (RETIN-A) 0 025 % cream; Apply topically daily at bedtime            Subjective:        Patient ID: Mildred Tejeda is a 46 y o  female  Patient is here to follow-up on anxiety and depression as well as allergies  Patient will need refills  Patient is not sleeping great overall  Patient has some difficulty falling asleep and staying asleep  Mood is better overall  Homicidal suicidal ideation  The following portions of the patient's history were reviewed and updated as appropriate: allergies, current medications, past family history, past medical history, past social history, past surgical history and problem list       Review of Systems   Constitutional: Negative  HENT: Negative  Eyes: Negative  Respiratory: Negative  Cardiovascular: Negative  Gastrointestinal: Negative  Endocrine: Negative  Genitourinary: Negative  Musculoskeletal: Negative  Skin: Negative  Allergic/Immunologic: Negative  Neurological: Negative      Hematological: "Negative  Psychiatric/Behavioral: Positive for sleep disturbance  The patient is not nervous/anxious  Objective:               /82 (BP Location: Left arm, Patient Position: Sitting, Cuff Size: Large)   Pulse 92   Temp 98 °F (36 7 °C) (Tympanic)   Ht 5' 5\" (1 651 m)   Wt 90 1 kg (198 lb 9 6 oz)   SpO2 97%   BMI 33 05 kg/m²          Physical Exam  Vitals and nursing note reviewed  Constitutional:       General: She is not in acute distress  Appearance: Normal appearance  She is not ill-appearing, toxic-appearing or diaphoretic  HENT:      Head: Normocephalic and atraumatic  Right Ear: Tympanic membrane, ear canal and external ear normal  There is no impacted cerumen  Left Ear: Tympanic membrane, ear canal and external ear normal  There is no impacted cerumen  Nose: Nose normal  No congestion or rhinorrhea  Mouth/Throat:      Mouth: Mucous membranes are moist       Pharynx: No oropharyngeal exudate or posterior oropharyngeal erythema  Eyes:      General: No scleral icterus  Right eye: No discharge  Left eye: No discharge  Extraocular Movements: Extraocular movements intact  Conjunctiva/sclera: Conjunctivae normal       Pupils: Pupils are equal, round, and reactive to light  Neck:      Vascular: No carotid bruit  Cardiovascular:      Rate and Rhythm: Normal rate and regular rhythm  Pulses: Normal pulses  Heart sounds: Normal heart sounds  No murmur heard  No friction rub  No gallop  Pulmonary:      Effort: Pulmonary effort is normal  No respiratory distress  Breath sounds: Normal breath sounds  No stridor  No wheezing, rhonchi or rales  Chest:      Chest wall: No tenderness  Musculoskeletal:         General: No swelling, tenderness, deformity or signs of injury  Normal range of motion  Cervical back: Normal range of motion and neck supple  No rigidity  No muscular tenderness  Right lower leg: No edema   " Left lower leg: No edema  Lymphadenopathy:      Cervical: No cervical adenopathy  Skin:     General: Skin is warm and dry  Capillary Refill: Capillary refill takes less than 2 seconds  Coloration: Skin is not jaundiced  Findings: No bruising, erythema, lesion or rash  Neurological:      Mental Status: She is alert and oriented to person, place, and time  Mental status is at baseline  Cranial Nerves: No cranial nerve deficit  Sensory: No sensory deficit  Motor: No weakness  Coordination: Coordination normal       Gait: Gait normal    Psychiatric:         Mood and Affect: Mood normal          Behavior: Behavior normal          Thought Content:  Thought content normal          Judgment: Judgment normal

## 2023-10-16 ENCOUNTER — OFFICE VISIT (OUTPATIENT)
Dept: FAMILY MEDICINE CLINIC | Facility: CLINIC | Age: 53
End: 2023-10-16
Payer: COMMERCIAL

## 2023-10-16 ENCOUNTER — PATIENT MESSAGE (OUTPATIENT)
Dept: FAMILY MEDICINE CLINIC | Facility: CLINIC | Age: 53
End: 2023-10-16

## 2023-10-16 VITALS
TEMPERATURE: 98.6 F | DIASTOLIC BLOOD PRESSURE: 90 MMHG | HEART RATE: 112 BPM | BODY MASS INDEX: 32.92 KG/M2 | WEIGHT: 197.6 LBS | HEIGHT: 65 IN | OXYGEN SATURATION: 97 % | SYSTOLIC BLOOD PRESSURE: 150 MMHG

## 2023-10-16 DIAGNOSIS — R05.1 ACUTE COUGH: Primary | ICD-10-CM

## 2023-10-16 DIAGNOSIS — R73.01 IMPAIRED FASTING GLUCOSE: ICD-10-CM

## 2023-10-16 DIAGNOSIS — I10 PRIMARY HYPERTENSION: Primary | ICD-10-CM

## 2023-10-16 DIAGNOSIS — R05.1 ACUTE COUGH: ICD-10-CM

## 2023-10-16 PROCEDURE — 99214 OFFICE O/P EST MOD 30 MIN: CPT | Performed by: FAMILY MEDICINE

## 2023-10-16 RX ORDER — ALBUTEROL SULFATE 90 UG/1
2 AEROSOL, METERED RESPIRATORY (INHALATION) EVERY 6 HOURS PRN
COMMUNITY
Start: 2023-10-15

## 2023-10-16 RX ORDER — PREDNISONE 20 MG/1
60 TABLET ORAL DAILY
COMMUNITY
Start: 2023-10-15 | End: 2023-10-20

## 2023-10-16 RX ORDER — OLMESARTAN MEDOXOMIL 20 MG/1
20 TABLET ORAL DAILY
Qty: 90 TABLET | Refills: 3 | Status: SHIPPED | OUTPATIENT
Start: 2023-10-16

## 2023-10-16 RX ORDER — AZITHROMYCIN 250 MG/1
TABLET, FILM COATED ORAL
Qty: 6 TABLET | Refills: 0 | Status: SHIPPED | OUTPATIENT
Start: 2023-10-16 | End: 2023-10-17 | Stop reason: SINTOL

## 2023-10-16 NOTE — PROGRESS NOTES
Assessment/Plan: Records reviewed. Patient use Z-Mauro as directed. Patient may continue with prednisone and albuterol as directed. Patient will laboratory studies done. Patient will start Benicar as directed. Patient will laboratory studies done roughly 1 to 2 weeks later. Patient will follow-up in 1 month. Diagnoses and all orders for this visit:    Primary hypertension  -     CBC and differential; Future  -     Comprehensive metabolic panel; Future  -     Hemoglobin A1C; Future  -     Lipid panel; Future  -     TSH, 3rd generation with Free T4 reflex; Future  -     Cortisol Level,7-9 AM Specimen; Future  -     Metanephrine, Fractionated Plasma Free; Future  -     olmesartan (BENICAR) 20 mg tablet; Take 1 tablet (20 mg total) by mouth daily    Acute cough  -     azithromycin (ZITHROMAX) 250 mg tablet; Take 2 tablets today then 1 tablet daily x 4 days    Impaired fasting glucose  -     CBC and differential; Future  -     Comprehensive metabolic panel; Future  -     Hemoglobin A1C; Future  -     Lipid panel; Future  -     TSH, 3rd generation with Free T4 reflex; Future  -     Cortisol Level,7-9 AM Specimen; Future  -     Metanephrine, Fractionated Plasma Free; Future    Other orders  -     albuterol (PROVENTIL HFA,VENTOLIN HFA) 90 mcg/act inhaler; Inhale 2 puffs every 6 (six) hours as needed  -     predniSONE 20 mg tablet; Take 60 mg by mouth daily            Subjective:        Patient ID: Ankit Gomez is a 48 y.o. female. Patient is here with cough, chest congestion over the past 2 weeks. No fever noted. Patient also with elevated blood pressure. [facility] Chest x-ray negative. Blood pressure 168/120 yesterday. Patient given prednisone as well as albuterol. Patient not on any other medication OTC. Some headaches noted. Cough  Associated symptoms include headaches.          The following portions of the patient's history were reviewed and updated as appropriate: allergies, current medications, past family history, past medical history, past social history, past surgical history and problem list.      Review of Systems   Constitutional: Negative. HENT: Negative. Eyes: Negative. Respiratory:  Positive for cough. Cardiovascular: Negative. Gastrointestinal: Negative. Endocrine: Negative. Genitourinary: Negative. Musculoskeletal: Negative. Skin: Negative. Allergic/Immunologic: Negative. Neurological:  Positive for headaches. Hematological: Negative. Psychiatric/Behavioral: Negative. Objective:               /90 (BP Location: Right arm, Patient Position: Sitting, Cuff Size: Large)   Pulse (!) 112   Temp 98.6 °F (37 °C) (Temporal)   Ht 5' 5" (1.651 m)   Wt 89.6 kg (197 lb 9.6 oz)   SpO2 97%   BMI 32.88 kg/m²          Physical Exam  Vitals and nursing note reviewed. Constitutional:       General: She is not in acute distress. Appearance: Normal appearance. She is not ill-appearing, toxic-appearing or diaphoretic. HENT:      Head: Normocephalic and atraumatic. Right Ear: Tympanic membrane, ear canal and external ear normal. There is no impacted cerumen. Left Ear: Tympanic membrane, ear canal and external ear normal. There is no impacted cerumen. Nose: Nose normal. No congestion or rhinorrhea. Mouth/Throat:      Mouth: Mucous membranes are moist.      Pharynx: Oropharyngeal exudate present. No posterior oropharyngeal erythema. Eyes:      General: No scleral icterus. Right eye: No discharge. Left eye: No discharge. Neck:      Vascular: No carotid bruit. Cardiovascular:      Rate and Rhythm: Normal rate and regular rhythm. Pulses: Normal pulses. Heart sounds: Normal heart sounds. No murmur heard. No friction rub. No gallop. Pulmonary:      Effort: Pulmonary effort is normal. No respiratory distress. Breath sounds: Normal breath sounds.  No stridor. No wheezing, rhonchi or rales. Chest:      Chest wall: No tenderness. Musculoskeletal:         General: No swelling, tenderness, deformity or signs of injury. Normal range of motion. Cervical back: Normal range of motion and neck supple. No rigidity. No muscular tenderness. Right lower leg: No edema. Left lower leg: No edema. Lymphadenopathy:      Cervical: No cervical adenopathy. Skin:     General: Skin is warm and dry. Capillary Refill: Capillary refill takes less than 2 seconds. Coloration: Skin is not jaundiced. Findings: No bruising, erythema, lesion or rash. Neurological:      Mental Status: She is alert and oriented to person, place, and time. Mental status is at baseline. Cranial Nerves: No cranial nerve deficit. Sensory: No sensory deficit. Motor: No weakness. Coordination: Coordination normal.      Gait: Gait normal.   Psychiatric:         Mood and Affect: Mood normal.         Behavior: Behavior normal.         Thought Content:  Thought content normal.         Judgment: Judgment normal.

## 2023-10-17 RX ORDER — DOXYCYCLINE HYCLATE 100 MG/1
100 CAPSULE ORAL EVERY 12 HOURS SCHEDULED
Qty: 14 CAPSULE | Refills: 0 | Status: SHIPPED | OUTPATIENT
Start: 2023-10-17 | End: 2023-10-24

## 2023-11-01 LAB — HBA1C MFR BLD HPLC: 6 %

## 2023-11-09 ENCOUNTER — TELEPHONE (OUTPATIENT)
Dept: FAMILY MEDICINE CLINIC | Facility: CLINIC | Age: 53
End: 2023-11-09

## 2023-11-10 ENCOUNTER — TELEPHONE (OUTPATIENT)
Dept: FAMILY MEDICINE CLINIC | Facility: CLINIC | Age: 53
End: 2023-11-10

## 2023-11-13 ENCOUNTER — RA CDI HCC (OUTPATIENT)
Dept: OTHER | Facility: HOSPITAL | Age: 53
End: 2023-11-13

## 2023-11-13 ENCOUNTER — TELEPHONE (OUTPATIENT)
Dept: FAMILY MEDICINE CLINIC | Facility: CLINIC | Age: 53
End: 2023-11-13

## 2023-11-13 NOTE — TELEPHONE ENCOUNTER
----- Message from Zac Narayan DO sent at 11/9/2023 12:08 PM EST -----  Call patient. Labs normal overall other than cholesterol 219 and A1c 6.0 consistent with prediabetic state. Patient to have ADA diet. Restrict fat/cholesterol intake.   Would suggest repeating labs at follow-up visit in 6 months

## 2023-11-13 NOTE — PROGRESS NOTES
[address] [facility] coding opportunities       Chart reviewed, no opportunity found: CHART REVIEWED, NO OPPORTUNITY FOUND        Patients Insurance        Commercial Insurance: [employer]

## 2023-11-13 NOTE — TELEPHONE ENCOUNTER
Patient has sore throat, hoarse and some cough with headache, patient has an appt scheduled this Thursday to discuss labs, doenst want to have an appt sooner, just want to see what medication you can suggest until she comes in. Covid test was negative, Has only taken tylenol and ifbuprofen. Any other suggestions?

## 2023-11-16 ENCOUNTER — OFFICE VISIT (OUTPATIENT)
Dept: FAMILY MEDICINE CLINIC | Facility: CLINIC | Age: 53
End: 2023-11-16
Payer: COMMERCIAL

## 2023-11-16 VITALS
BODY MASS INDEX: 32.92 KG/M2 | OXYGEN SATURATION: 96 % | HEIGHT: 65 IN | HEART RATE: 89 BPM | WEIGHT: 197.6 LBS | SYSTOLIC BLOOD PRESSURE: 142 MMHG | TEMPERATURE: 98.9 F | DIASTOLIC BLOOD PRESSURE: 94 MMHG

## 2023-11-16 DIAGNOSIS — I10 PRIMARY HYPERTENSION: ICD-10-CM

## 2023-11-16 DIAGNOSIS — J01.00 ACUTE NON-RECURRENT MAXILLARY SINUSITIS: ICD-10-CM

## 2023-11-16 DIAGNOSIS — F32.0 CURRENT MILD EPISODE OF MAJOR DEPRESSIVE DISORDER, UNSPECIFIED WHETHER RECURRENT (HCC): ICD-10-CM

## 2023-11-16 DIAGNOSIS — K21.00 GASTROESOPHAGEAL REFLUX DISEASE WITH ESOPHAGITIS WITHOUT HEMORRHAGE: ICD-10-CM

## 2023-11-16 DIAGNOSIS — J30.1 SEASONAL ALLERGIC RHINITIS DUE TO POLLEN: ICD-10-CM

## 2023-11-16 DIAGNOSIS — R73.01 IMPAIRED FASTING GLUCOSE: Primary | ICD-10-CM

## 2023-11-16 PROCEDURE — 99214 OFFICE O/P EST MOD 30 MIN: CPT | Performed by: FAMILY MEDICINE

## 2023-11-16 RX ORDER — OLMESARTAN MEDOXOMIL AND HYDROCHLOROTHIAZIDE 20/12.5 20; 12.5 MG/1; MG/1
1 TABLET ORAL DAILY
Qty: 90 TABLET | Refills: 1 | Status: SHIPPED | OUTPATIENT
Start: 2023-11-16

## 2023-11-16 RX ORDER — CEFDINIR 300 MG/1
300 CAPSULE ORAL EVERY 12 HOURS SCHEDULED
Qty: 20 CAPSULE | Refills: 0 | Status: SHIPPED | OUTPATIENT
Start: 2023-11-16 | End: 2023-11-26

## 2023-11-16 RX ORDER — MONTELUKAST SODIUM 10 MG/1
10 TABLET ORAL
Qty: 90 TABLET | Refills: 3 | Status: SHIPPED | OUTPATIENT
Start: 2023-11-16

## 2023-11-16 NOTE — PROGRESS NOTES
Assessment/Plan: A1c 6.0. CMP TSH reviewed CBC normal.  Cortisol normal total cholesterol 219. Metanephrines normal.  TSH normal.  Patient will continue with low sugar diet regarding elevated A1c/prediabetic state. Patient will modify diet regarding hyperlipidemic state. GERD and depression stable. Continue with current regimen of medications for chronic issues. Patient will switch to Benicar HCT for hypertension. Patient is Omnicef as directed. Follow-up 3 months       Diagnoses and all orders for this visit:    Impaired fasting glucose    Gastroesophageal reflux disease with esophagitis without hemorrhage    Primary hypertension  -     olmesartan-hydrochlorothiazide (BENICAR HCT) 20-12.5 MG per tablet; Take 1 tablet by mouth daily    Current mild episode of major depressive disorder, unspecified whether recurrent (HCC)    Acute non-recurrent maxillary sinusitis  -     cefdinir (OMNICEF) 300 mg capsule; Take 1 capsule (300 mg total) by mouth every 12 (twelve) hours for 10 days    Seasonal allergic rhinitis due to pollen  -     montelukast (SINGULAIR) 10 mg tablet; Take 1 tablet (10 mg total) by mouth daily at bedtime            Subjective:        Patient ID: Luke Adam is a 48 y.o. female. Patient with ongoing sore throat, fatigue headache with postnasal drip worsening since Sunday. Patient did have a fever. Patient using Mucinex cold and flu. Patient also here to follow-up on chronic issues. COVID test negative. Mood stable. Patient otherwise doing well. The following portions of the patient's history were reviewed and updated as appropriate: allergies, current medications, past family history, past medical history, past social history, past surgical history and problem list.      Review of Systems   Constitutional:  Positive for fatigue and fever. HENT:  Positive for postnasal drip, sinus pressure and sinus pain. Eyes: Negative. Respiratory: Negative.      Cardiovascular: Negative. Gastrointestinal: Negative. Endocrine: Negative. Genitourinary: Negative. Musculoskeletal:  Positive for arthralgias and myalgias. Skin: Negative. Allergic/Immunologic: Negative. Neurological: Negative. Hematological: Negative. Psychiatric/Behavioral: Negative. Objective:               /94 (BP Location: Right arm, Patient Position: Sitting, Cuff Size: Standard)   Pulse 89   Temp 98.9 °F (37.2 °C) (Temporal)   Ht 5' 5" (1.651 m)   Wt 89.6 kg (197 lb 9.6 oz)   SpO2 96%   BMI 32.88 kg/m²          Physical Exam  Vitals and nursing note reviewed. Constitutional:       General: She is not in acute distress. Appearance: Normal appearance. She is not ill-appearing, toxic-appearing or diaphoretic. HENT:      Head: Normocephalic and atraumatic. Right Ear: Tympanic membrane, ear canal and external ear normal. There is no impacted cerumen. Left Ear: Tympanic membrane, ear canal and external ear normal. There is no impacted cerumen. Nose: Rhinorrhea present. No congestion. Mouth/Throat:      Mouth: Mucous membranes are moist.      Pharynx: Oropharyngeal exudate present. No posterior oropharyngeal erythema. Eyes:      General: No scleral icterus. Right eye: No discharge. Left eye: No discharge. Neck:      Vascular: No carotid bruit. Cardiovascular:      Rate and Rhythm: Normal rate and regular rhythm. Pulses: Normal pulses. Heart sounds: Normal heart sounds. No murmur heard. No friction rub. No gallop. Pulmonary:      Effort: Pulmonary effort is normal. No respiratory distress. Breath sounds: Normal breath sounds. No stridor. No wheezing, rhonchi or rales. Chest:      Chest wall: No tenderness. Musculoskeletal:         General: No swelling, tenderness, deformity or signs of injury. Normal range of motion. Cervical back: Normal range of motion and neck supple. No rigidity.  No muscular tenderness. Right lower leg: No edema. Left lower leg: No edema. Lymphadenopathy:      Cervical: No cervical adenopathy. Skin:     General: Skin is warm and dry. Capillary Refill: Capillary refill takes less than 2 seconds. Coloration: Skin is not jaundiced. Findings: No bruising, erythema, lesion or rash. Neurological:      Mental Status: She is alert and oriented to person, place, and time. Mental status is at baseline. Cranial Nerves: No cranial nerve deficit. Sensory: No sensory deficit. Motor: No weakness. Coordination: Coordination normal.      Gait: Gait normal.   Psychiatric:         Mood and Affect: Mood normal.         Behavior: Behavior normal.         Thought Content:  Thought content normal.         Judgment: Judgment normal.

## 2023-11-25 DIAGNOSIS — F41.9 ANXIETY: ICD-10-CM

## 2023-11-27 RX ORDER — SERTRALINE HYDROCHLORIDE 100 MG/1
100 TABLET, FILM COATED ORAL DAILY
Qty: 30 TABLET | Refills: 5 | Status: SHIPPED | OUTPATIENT
Start: 2023-11-27

## 2023-11-29 ENCOUNTER — TELEPHONE (OUTPATIENT)
Dept: FAMILY MEDICINE CLINIC | Facility: CLINIC | Age: 53
End: 2023-11-29

## 2023-11-29 DIAGNOSIS — B37.9 YEAST INFECTION: Primary | ICD-10-CM

## 2023-11-29 RX ORDER — FLUCONAZOLE 150 MG/1
TABLET ORAL
Qty: 2 TABLET | Refills: 0 | Status: SHIPPED | OUTPATIENT
Start: 2023-11-29 | End: 2023-12-06

## 2023-11-29 NOTE — TELEPHONE ENCOUNTER
PLEASE ADVISE    I have been on two rounds of antibiotics. I just finished the 2nd one. I do believe I have a yeast infection. Can Dr. Raya King send in a prescription for that? I would really appreciate that. [facility] in [facility].

## 2023-12-09 DIAGNOSIS — I10 PRIMARY HYPERTENSION: ICD-10-CM

## 2023-12-11 RX ORDER — OLMESARTAN MEDOXOMIL AND HYDROCHLOROTHIAZIDE 20/12.5 20; 12.5 MG/1; MG/1
1 TABLET ORAL DAILY
Qty: 90 TABLET | Refills: 1 | Status: SHIPPED | OUTPATIENT
Start: 2023-12-11

## 2023-12-15 PROBLEM — R05.1 ACUTE COUGH: Status: RESOLVED | Noted: 2023-10-16 | Resolved: 2023-12-15

## 2023-12-24 DIAGNOSIS — F41.9 ANXIETY: ICD-10-CM

## 2023-12-26 RX ORDER — SERTRALINE HYDROCHLORIDE 100 MG/1
100 TABLET, FILM COATED ORAL DAILY
Qty: 90 TABLET | Refills: 1 | Status: SHIPPED | OUTPATIENT
Start: 2023-12-26

## 2024-02-12 ENCOUNTER — CONSULT (OUTPATIENT)
Dept: GASTROENTEROLOGY | Facility: MEDICAL CENTER | Age: 54
End: 2024-02-12
Payer: COMMERCIAL

## 2024-02-12 ENCOUNTER — TELEPHONE (OUTPATIENT)
Dept: GASTROENTEROLOGY | Facility: MEDICAL CENTER | Age: 54
End: 2024-02-12

## 2024-02-12 VITALS
TEMPERATURE: 100 F | WEIGHT: 200.4 LBS | HEART RATE: 87 BPM | SYSTOLIC BLOOD PRESSURE: 118 MMHG | DIASTOLIC BLOOD PRESSURE: 81 MMHG | BODY MASS INDEX: 33.39 KG/M2 | HEIGHT: 65 IN

## 2024-02-12 DIAGNOSIS — Z12.11 COLON CANCER SCREENING: ICD-10-CM

## 2024-02-12 DIAGNOSIS — K21.9 GASTROESOPHAGEAL REFLUX DISEASE WITHOUT ESOPHAGITIS: Primary | ICD-10-CM

## 2024-02-12 PROCEDURE — 99244 OFF/OP CNSLTJ NEW/EST MOD 40: CPT | Performed by: NURSE PRACTITIONER

## 2024-02-12 RX ORDER — POLYETHYLENE GLYCOL 3350, SODIUM SULFATE ANHYDROUS, SODIUM BICARBONATE, SODIUM CHLORIDE, POTASSIUM CHLORIDE 236; 22.74; 6.74; 5.86; 2.97 G/4L; G/4L; G/4L; G/4L; G/4L
POWDER, FOR SOLUTION ORAL
Qty: 4000 ML | Refills: 0 | Status: SHIPPED | OUTPATIENT
Start: 2024-02-12

## 2024-02-12 RX ORDER — CETIRIZINE HYDROCHLORIDE 10 MG/1
10 TABLET ORAL DAILY
COMMUNITY

## 2024-02-12 RX ORDER — FAMOTIDINE 20 MG/1
20 TABLET, FILM COATED ORAL
Qty: 90 TABLET | Refills: 3 | Status: SHIPPED | OUTPATIENT
Start: 2024-02-12

## 2024-02-12 NOTE — TELEPHONE ENCOUNTER
ASC Screening    ASC Screening  BMI > than 45: No  Are you currently pregnant?: No  Do you rely on a wheelchair for mobility?: No  Do you need oxygen during the day?: No  Have you ever been informed by anesthesia that you have a difficult airway?: No  Have you been diagnosed with End Stage Renal Disease (ESRD)?: No  Are you actively on dialysis?: No  Have you been diagnosed with Pulmonary Hypertension?: No  Do you have a pacemaker or an Automatic Implantable Cardioverter Defibrillator (AICD)?: No  Have you ever had an organ transplant?: No  Have you had a stroke, heart attack, myocardial infarction (MI) within the last 6 months?: No  Have you ever been diagnosed with Aortic Stenosis?: No  Have you ever been diagnosed  with Congestive Heart Failure?: No  Have you ever been diagnosed with a heart valve disease?: No  Are you Diabetic?: No  If you are Diabetic, has your A1C been greater than 12 within the last six months?: N/A        Procedure: EGD/Colonoscopy  Date: 03/26/2024  Physician performing: Dr. Johnson  Location of procedure:  [facility]  Instructions given to patient: Yes  Diabetic: No  Clearances: N/A

## 2024-02-12 NOTE — PROGRESS NOTES
[facility] - Outpatient Consultation  Bethany Russell 53 y.o. female MRN: [id number]  Encounter: [id number]          ASSESSMENT AND PLAN:    Bethany Russell is a 53 y.o. female who presents with complaint of GERD.    1. Colon cancer screening    BMs are brown and formed daily.  Denies any melena or hematochezia.  No family history of any colon cancers or polyps.  Never had a colonoscopy.    2.  GERD    History of GERD for many years.  Never had an EGD.  She has been on multiple PPIs in the past.  Currently taking pantoprazole 40 mg daily in the morning.  Reporting some increased belching as the day progresses.  She can awaken in the morning with belching.  Denies any nausea, vomiting or dysphagia.  No weight loss.  Intermittent epigastric burning postprandially.  Can be worse after eating nuts or red meat.  She does drink 2 to 3 cups of caffeine per day.  No NSAID use.  Rare alcohol use.  Will rule out celiac, PUD, gastritis.  Patient does not want to hold her PPI to check for H. pylori at this time.    -Continue pantoprazole 40 mg in the morning  -Add Pepcid 20 mg nightly  -Celiac panel  -Antireflux diet.  Reduce caffeine intake  -EGD  -Follow-up in office after test      I reviewed with patient/family potential risks of endoscopic evaluation including possible infection, bleeding or perforation.  Patient/family verbalized understanding of potential risks and agreed to procedure(s).     ______________________________________________________________________    HPI:    Bethany Russell is a 53 y.o. female who presents with complaint of GERD.  She has a past medical history of GERD, hemangioma of the liver, HTN, seborrheic dermatitis, depression, RLS, Renal Cell Ca with left nephrectomy 2002.     Has had longstanding GERD for many years.  At times it is controlled with a PPI.  She has been on several PPIs in the past.  Currently taking pantoprazole 40 mg daily in the morning.  Reporting some  increased belching as the day progresses.  She can awaken in the morning with belching.  Denies any nausea, vomiting or dysphagia.  No weight loss.  Intermittent epigastric burning postprandially.  Never had an EGD.    BMs are brown and formed daily.  Denies any melena or hematochezia.  No family history of any colon cancers or polyps.  Never had a colonoscopy.    Labs 11/23-CMP normal other than glucose 106, hemoglobin A1c 6.0.    Never had any EGD or colonoscopy.      REVIEW OF SYSTEMS:    CONSTITUTIONAL: Denies any fever, chills, rigors, and weight loss.  HEENT: No earache or tinnitus. Denies hearing loss or visual disturbances.  CARDIOVASCULAR: No chest pain or palpitations.   RESPIRATORY: Denies any cough, hemoptysis, shortness of breath or dyspnea on exertion.  GASTROINTESTINAL: As noted in the History of Present Illness.   GENITOURINARY: No problems with urination. Denies any hematuria or dysuria.  NEUROLOGIC: No dizziness or vertigo, denies headaches.   MUSCULOSKELETAL: Denies any muscle or joint pain.   SKIN: Denies skin rashes or itching.   ENDOCRINE: Denies excessive thirst. Denies intolerance to heat or cold.  PSYCHOSOCIAL: Denies depression or anxiety. Denies any recent memory loss.       Historical Information   Past Medical History:   Diagnosis Date   • Allergic    • Allergic rhinitis     last assessed 6/1/15, resolved 11/11/15   • Anxiety    • BRCA1 negative 2019    neg but with high percetage future testing suggested    • BRCA2 negative 2019    neg    • Depression    • Environmental allergies    • GERD (gastroesophageal reflux disease)      Past Surgical History:   Procedure Laterality Date   • BREAST CYST ASPIRATION Right     About 8 years   • ELBOW SURGERY     • NEPHRECTOMY Left     last assessed 8/1/14   • NM HYSTEROSCOPY BX ENDOMETRIUM&/POLYPC W/WO D&C N/A 02/24/2022    Procedure: D&C WITH HYSTEROSCOPY;  Surgeon: Mendy Fowler DO;  Location: [facility];  Service: Gynecology   • NM HYSTEROSCOPY  ENDOMETRIAL ABLATION N/A 02/24/2022    Procedure: ABLATION ENDOMETRIAL HYDROTHERMAL;  Surgeon: Mendy Fowler DO;  Location: [facility];  Service: Gynecology     Social History   Social History     Substance and Sexual Activity   Alcohol Use Yes    Comment: Social 1-2x monthly     Social History     Substance and Sexual Activity   Drug Use No     Social History     Tobacco Use   Smoking Status Never   Smokeless Tobacco Never     Family History   Problem Relation Age of Onset   • Other Mother         health status good   • Breast cancer Mother 60   • Heart attack Father    • Other Father         health status good   • BRCA1 Negative Sister    • BRCA2 Negative Sister    • No Known Problems Daughter    • Breast cancer Maternal Grandmother         age unknown   • No Known Problems Maternal Grandfather    • No Known Problems Paternal Grandmother    • No Known Problems Paternal Grandfather    • Breast cancer Maternal Aunt         Late 30's   • Ovarian cancer Maternal Aunt         age unknown   • No Known Problems Maternal Aunt    • No Known Problems Maternal Aunt    • No Known Problems Paternal Aunt        Meds/Allergies       Current Outpatient Medications:   •  cetirizine (ZyrTEC) 10 mg tablet  •  famotidine (PEPCID) 20 mg tablet  •  hydrOXYzine HCL (ATARAX) 10 mg tablet  •  olmesartan-hydrochlorothiazide (BENICAR HCT) 20-12.5 MG per tablet  •  pantoprazole (PROTONIX) 40 mg tablet  •  PEG 3350-KCl-NaBcb-NaCl-NaSulf (PEG-3350/Electrolytes) 236 g SOLR  •  sertraline (ZOLOFT) 100 mg tablet  •  tretinoin (RETIN-A) 0.025 % cream  •  albuterol (PROVENTIL HFA,VENTOLIN HFA) 90 mcg/act inhaler  •  fluticasone (FLONASE) 50 mcg/act nasal spray  •  montelukast (SINGULAIR) 10 mg tablet  •  norethindrone (MICRONOR) 0.35 MG tablet    Allergies   Allergen Reactions   • Erythromycin Other (See Comments)     Oral sores   • Erythromycin Base    • Escitalopram      Annotation - [id number]: PT FEELS SHAKY W/ NAUSEA, DRY HEAVING / GASSY WHILE  "TAKING THIS MED.   • Levofloxacin      Annotation - [id number]: FEELS SHAKY W/ NAUSEA WHEN ON MEDICATION.   • Azithromycin Rash   • Penicillins Hives and Rash           Objective     Blood pressure 118/81, pulse 87, temperature 100 °F (37.8 °C), temperature source Tympanic, height 5' 5\" (1.651 m), weight 90.9 kg (200 lb 6.4 oz), not currently breastfeeding. Body mass index is 33.35 kg/m².        PHYSICAL EXAM:      General Appearance:   Alert, cooperative, no distress   HEENT:   Normocephalic, atraumatic, anicteric.     Neck:  Supple, symmetrical, trachea midline   Lungs:   Clear to auscultation bilaterally; no rales, rhonchi or wheezing; respirations unlabored    Heart::   Regular rate and rhythm; no murmur, rub, or gallop.   Abdomen:   Soft, non-tender, non-distended; normal bowel sounds; no masses, no organomegaly    Genitalia:   Deferred    Rectal:   Deferred    Extremities:  No cyanosis, clubbing or edema    Pulses:  2+ and symmetric    Skin:  No jaundice, rashes, or lesions    Lymph nodes:  No palpable cervical lymphadenopathy        Lab Results:   No visits with results within 1 Day(s) from this visit.   Latest known visit with results is:   Orders Only on 11/01/2023   Component Date Value   • Hemoglobin A1C 11/01/2023 6.0        Lab Results   Component Value Date    WBC 5.41 05/08/2019    HGB 13.5 05/08/2019    HCT 42.2 05/08/2019    MCV 91 05/08/2019     05/08/2019       Lab Results   Component Value Date    SODIUM 141 11/01/2023    K 4.2 11/01/2023     11/01/2023    CO2 28 11/01/2023    AGAP 8 11/01/2023    BUN 19 11/01/2023    CREATININE 0.90 11/01/2023    GLUC 106 (H) 11/01/2023    GLUF 88 05/08/2019    CALCIUM 9.2 11/01/2023    AST 14 11/01/2023    ALT 20 11/01/2023    ALKPHOS 47 11/01/2023    TP 6.6 11/01/2023    TBILI 0.4 11/01/2023    EGFR 76 11/01/2023       No results found for: \"CRP\"    Lab Results   Component Value Date    [id number] 3.260 05/08/2019       No results found for: " "\"IRON\", \"TIBC\", \"FERRITIN\"    Radiology Results:   No results found.  "

## 2024-02-12 NOTE — PATIENT INSTRUCTIONS
GERD (Gastroesophageal Reflux Disease)   AMBULATORY CARE:   Gastroesophageal reflux disease (GERD)  is reflux that happens more than 2 times a week for a few weeks. Reflux means acid and food in your stomach back up into your esophagus. GERD can cause other health problems over time if it is not treated.       Common causes of GERD:  GERD often happens because the lower muscle (sphincter) of the esophagus does not close properly. The sphincter normally opens to let food into the stomach. It then closes to keep food and stomach acid in the stomach. If the sphincter does not close properly, stomach acid and food back up (reflux) into the esophagus. The following may increase your risk for GERD:  Certain foods such as spicy foods, chocolate, foods that contain caffeine, peppermint, and fried foods    Hiatal hernia    Certain medicines such as calcium channel blockers (used to treat high blood pressure), allergy medicines, sedatives, or antidepressants    Pregnancy, obesity, or scleroderma    Lying down after a meal    Drinking alcohol or smoking cigarettes    Signs and symptoms:   Heartburn (burning pain in your chest)    Pain after meals that spreads to your neck, jaw, or shoulder    Pain that gets better when you change positions    Bitter or acid taste in your mouth    A dry cough    Trouble swallowing or pain with swallowing    Hoarseness or a sore throat    Burping or hiccups    Feeling full soon after you start eating    Call your local emergency number (911 in the [place]) if:   You have severe chest pain and sudden trouble breathing.      Seek care immediately if:   You have trouble breathing after you vomit.    You have trouble swallowing, or pain with swallowing.    Your bowel movements are black, bloody, or tarry-looking.    Your vomit looks like coffee grounds or has blood in it.    Call your doctor or gastroenterologist if:   You feel full and cannot burp or vomit.    You vomit large amounts, or you vomit  often.    You are losing weight without trying.    Your symptoms get worse or do not improve with treatment.    You have questions or concerns about your condition or care.    Treatment for GERD:   Medicines  are used to decrease stomach acid. Medicine may also be used to help your lower esophageal sphincter and stomach contract (tighten) more.    Surgery  is done to wrap the upper part of the stomach around the esophageal sphincter. This will strengthen the sphincter and prevent reflux.    Manage GERD:       Do not have foods or drinks that may increase heartburn.  These include chocolate, peppermint, fried or fatty foods, drinks that contain caffeine, or carbonated drinks (soda). Other foods include spicy foods, onions, tomatoes, and tomato-based foods. Do not have foods or drinks that can irritate your esophagus, such as citrus fruits, juices, and alcohol.    Do not eat large meals.  When you eat a lot of food at one time, your stomach needs more acid to digest it. Eat 6 small meals each day instead of 3 large meals, and eat slowly. Do not eat meals 2 to 3 hours before bedtime.    Elevate the head of your bed.  Place 6-inch blocks under the head of your bed frame. You may also use more than one pillow under your head and shoulders while you sleep.    Maintain a healthy weight.  If you are overweight, weight loss may help relieve symptoms of GERD.    Do not smoke.  Smoking weakens the lower esophageal sphincter and increases the risk of GERD. Ask your healthcare provider for information if you currently smoke and need help to quit. E-cigarettes or smokeless tobacco still contain nicotine. Talk to your healthcare provider before you use these products.    Do not put pressure on your abdomen.  Pressure pushes acid up into your esophagus. Do not wear clothing that is tight around your waist. Do not bend over. Bend at the knees if you need to pick something up.  Follow up with your doctor or gastroenterologist as  directed:  Write down your questions so you remember to ask them during your visits.  © Copyright [employer] 2023 Information is for End User's use only and may not be sold, redistributed or otherwise used for commercial purposes.  The above information is an  only. It is not intended as medical advice for individual conditions or treatments. Talk to your doctor, nurse or pharmacist before following any medical regimen to see if it is safe and effective for you.  Moderate Sedation   AMBULATORY CARE:   What you need to know about moderate sedation:  Moderate sedation, or conscious sedation, is medicine used during procedures to help you feel relaxed and calm. You will be awake and able to follow directions without anxiety or pain. You will remember little to none of the procedure. Moderate sedation can be used for procedures such as a colonoscopy, wound repair, cataract removal, or dental work. The medicine is given as a pill, shot, inhaled solution, or injection through an IV.  How to prepare for moderate sedation:  Your healthcare provider will talk to you about how to prepare for moderate sedation. You may be told not to eat or drink anything for 8 hours before moderate sedation. You may be able to drink clear liquids up until 2 hours before moderate sedation. Tell healthcare providers if you have any allergies, heart problems, or breathing problems. Arrange for someone to drive you home and stay with you for 24 hours. You may feel sleepy and need help doing things at home. Another person may need to call 911 if you cannot be woken.  What will happen during moderate sedation:  Your healthcare provider will give you enough medicine to keep you relaxed and calm. Your healthcare provider will monitor your blood pressure, heart rate, and breathing. You will be on a heart monitor and a pulse oximeter. A heart monitor is a safety device that stays on continuously to record your heart's electrical activity.  A pulse oximeter is a device that measures the amount of oxygen in your blood. You may get oxygen through a mask placed over your nose and mouth or through small tubes placed in your nostrils.  What will happen after moderate sedation:  Healthcare providers will monitor you until you are awake. You may need extra oxygen if your blood oxygen level is lower than it should be. Ask your healthcare provider before you take off the mask or oxygen tubing. You may be able to go home when you are alert and can stand up. This may take 1 to 2 hours after you have received moderate sedation. You may feel tired, weak, or unsteady on your feet after you get sedation. You may also have trouble concentrating or short-term memory loss. These symptoms should go away in 24 hours or less.  Risks of moderate sedation:   You may get a headache or nausea from the medicine. You may have problems with your short-term memory. Your skin may itch or your eyes may water. You may not get enough sedation, or it may wear off quickly. You may feel restless during the procedure or as you wake up.    Too much medicine can cause deep sedation. Your healthcare provider may have trouble waking you, and you may need medicine to help you wake up. Your breathing may not be regular, or it may stop. You may need a ventilator to help you breathe. Your risk for problems with sedation is higher if you have heart or lung disease, a head injury, or drink alcohol.    Call 911 or have someone else call for any of the following:   You have sudden trouble breathing.    You cannot be woken.    Seek care immediately if:   You have a severe headache or dizziness.    Your heart is beating faster than usual.    Contact your healthcare provider if:   You have a fever.    You have nausea or are vomiting for more than 8 hours after the procedure.    Your skin is itchy, swollen, or you have a rash.    You have questions or concerns about your condition or care.    Self-care:    Have someone stay with you for 24 hours.  This person can drive you to errands and help you do things around the house. This person can also watch for problems.    Rest and do quiet activities for 24 hours.  Do not exercise, ride a bike, or play sports. Stand up slowly to prevent dizziness and falls. Take short walks around the house with another person. Slowly return to your usual activities the next day.    Do not drive or use dangerous machines or tools for 24 hours.  You may injure yourself or others. Examples include a lawnmower, saw, or drill. Do not return to work for 24 hours if you use dangerous machines or tools for work.    Do not make important decisions for 24 hours.  For example, do not sign important papers or invest money.    Drink liquids as directed.  Liquids help flush the sedation medicine out of your body. Ask how much liquid to drink each day and which liquids are best for you.    Eat small, frequent meals to prevent nausea and vomiting.  Start with clear liquids such as juice or broth. If you do not vomit after clear liquids, you can eat your usual foods.    Do not drink alcohol or take medicines that make you drowsy.  This includes medicines that help you sleep and anxiety medicines. Ask your healthcare provider if it is safe for you to take pain medicine.    Follow up with your healthcare provider as directed:  Write down your questions so you remember to ask them during your visits.  © Copyright [employer] 2023 Information is for End User's use only and may not be sold, redistributed or otherwise used for commercial purposes.  The above information is an  only. It is not intended as medical advice for individual conditions or treatments. Talk to your doctor, nurse or pharmacist before following any medical regimen to see if it is safe and effective for you.  Colonoscopy   WHAT YOU NEED TO KNOW:   What do I need to know about a colonoscopy?  A colonoscopy is a procedure to  examine the inside of your colon (intestine) with a scope. A scope is a flexible tube with a small light and camera on the end. Polyps or tissue growths may be removed during your colonoscopy.       What do I need to do the week before my colonoscopy?  Give your healthcare provider a list of all the medicines, supplements, and herbs you take. You will need to stop taking medicines that contain aspirin or iron for 7 days before your colonoscopy. If you take a blood thinner, such as warfarin, ask when you should stop taking it. Make plans for someone to drive you home after your procedure.  How do I prepare for my colonoscopy?  Your healthcare provider will have you prepare your bowels before your procedure. It is important for your bowels to be empty before your procedure to allow him or her to see your colon clearly. You will need to do the following:  Have only clear liquids for the entire day before your colonoscopy.  Clear liquids include plain gelatin, unsweetened fruit juices, clear soup, and broth. Do not drink any liquid that is blue, red, or purple.    Follow your bowel prep as directed.  There are many different preparations that can be given before a colonoscopy. With any bowel prep, stay close to the bathroom. This prep will cause your bowels to move often.    Use an enema if directed.  Your healthcare provider may tell you to use an enema to help clean out your bowels.    Do not eat or drink anything after midnight.  This will help prevent problems that can happen if you vomit while under anesthesia.    What will happen during my colonoscopy?   You will be given medicine to help you relax. You will lie on your left side and raise one or both knees toward your chest. Your healthcare provider will examine your anus and use a gloved finger to check your rectum. You may need another enema if your bowel is not empty. The scope will be lubricated and gently placed into your anus. It will then be passed through  your rectum and into your colon. Water or air will be put into your colon to help clean or expand it. This is done so your healthcare provider can see your colon clearly.     Tissue samples may be taken from the walls of your bowel and sent to a lab for tests. If you have a polyp, your healthcare provider will pass a wire loop through the scope and use it to hold the polyp. The polyp is then removed from the wall of your colon. You should not feel this. The polyps are sent to a lab for tests. Pictures of your colon may be taken during the procedure.    What will happen after my colonoscopy?  You may feel bloated or have some gas and abdominal discomfort. You may need to lie on your left side with a heating pad on your abdomen. Eat small meals until your bloating has improved.  What are the risks of a colonoscopy?  You may have pain or bleeding. You may also have a slow heartbeat, decreased blood pressure, or increased sweating. Your colon may tear due to the increased pressure from the scope and other instruments. This may cause bowel contents to leak out of your colon and into your abdomen. If this happens, you will need to stay in the hospital and have surgery on your colon.  CARE AGREEMENT:   You have the right to help plan your care. Learn about your health condition and how it may be treated. Discuss treatment options with your healthcare providers to decide what care you want to receive. You always have the right to refuse treatment. The above information is an  only. It is not intended as medical advice for individual conditions or treatments. Talk to your doctor, nurse or pharmacist before following any medical regimen to see if it is safe and effective for you.  © Copyright [employer] 2023 Information is for End User's use only and may not be sold, redistributed or otherwise used for commercial purposes.  Upper Endoscopy   AMBULATORY CARE:   What you need to know about an upper endoscopy:  An  upper endoscopy is also called an upper gastrointestinal (GI) endoscopy, or an esophagogastroduodenoscopy (EGD). A scope (thin, flexible tube with a light and camera) is used to examine the walls of your upper digestive tract. The upper digestive tract includes the esophagus, stomach, and duodenum (first part of the small intestine). An upper endoscopy is used to look for problems, such as bleeding, polyps, ulcers, or infection.        How to prepare for an upper endoscopy:  Your healthcare provider will talk to you about how to prepare for your procedure. You may be told not eat or drink anything except water for 6 to 12 hours before the procedure. Your provider will tell you which medicines to take or not take on the day of your procedure. Arrange to have someone drive you home.  What will happen during an upper endoscopy:   You will be given medicine through your IV to help you relax and make you drowsy. You will also be given medicine to numb your throat. You may need to wear a plastic mouthpiece to help hold your mouth open and protect your teeth and tongue. Your provider will gently insert the endoscope through your mouth and down into your throat. You may be asked to swallow to help move the scope. You may feel pressure in your throat, but you should not feel pain. The endoscope does not restrict your breathing.    Your provider will watch the scope on a monitor and take pictures with the scope. Your provider may gently inject air to make it easier to see your digestive tract clearly. Your provider may take tissue samples and send them to a lab for tests. Foreign objects, tumors, or polyps that may be blocking your digestive tract may be removed. Your provider may also insert tools with the scope to treat bleeding or place a stent (tube).    What to expect after an upper endoscopy:  You may feel bloated, gassy, or have some abdominal discomfort. Your throat may be sore for 24 to 36 hours after the procedure.  You may burp or pass gas from air that is still inside your body after your procedure. You may need to take short walks to help move the gas out. Eat small meals, if you feel bloated. Do not drive or make important decisions until the day after your procedure.  Risks of an upper endoscopy:  Your esophagus, stomach, or duodenum may be punctured or torn during the procedure. This is because of increased pressure as the scope and air are passing through. You may bleed more than expected or get an infection. You may have a slow or irregular heartbeat, or low blood pressure. This can cause sweating and fainting. Fluid may enter your lungs and you may have trouble breathing. These problems can be life-threatening.  Call 911 if:   You have sudden chest pain or trouble breathing.      Seek care immediately if:   You feel dizzy or faint.    You have trouble swallowing.    You have severe throat pain.    Your bowel movements are very dark or black.    Your abdomen is hard and firm and you have severe pain.    You vomit blood.    Contact your healthcare provider if:   You feel full or bloated and cannot burp or pass gas.    You have not had a bowel movement for 3 days after your procedure.    You have neck pain.    You have a fever or chills.    You have nausea or are vomiting.    You have a rash or hives.    You have questions or concerns about your endoscopy.    Relieve a sore throat:  Suck on throat lozenges or crushed ice. Gargle with a small amount of warm salt water. Mix 1 teaspoon of salt and 1 cup of warm water to make salt water.  Relieve gas and discomfort from bloating:  Lie on your right side with a heating pad on your abdomen. Take short walks to help pass gas. Eat small meals until bloating is relieved.  Rest after your procedure:  You have been given medicine to relax you. Do not  drive or make important decisions until the day after your procedure. Return to your normal activity as directed. You can usually  return to work the day after your procedure.  Follow up with your healthcare provider as directed:  Write down your questions so you remember to ask them during your visits.  © Copyright [employer] 2023 Information is for End User's use only and may not be sold, redistributed or otherwise used for commercial purposes.  The above information is an  only. It is not intended as medical advice for individual conditions or treatments. Talk to your doctor, nurse or pharmacist before following any medical regimen to see if it is safe and effective for you.

## 2024-02-21 PROBLEM — J01.00 ACUTE NON-RECURRENT MAXILLARY SINUSITIS: Status: RESOLVED | Noted: 2018-07-26 | Resolved: 2024-02-21

## 2024-02-21 PROBLEM — N30.00 ACUTE CYSTITIS WITHOUT HEMATURIA: Status: RESOLVED | Noted: 2018-09-12 | Resolved: 2024-02-21

## 2024-03-12 ENCOUNTER — ANESTHESIA (OUTPATIENT)
Dept: ANESTHESIOLOGY | Facility: HOSPITAL | Age: 54
End: 2024-03-12

## 2024-03-12 ENCOUNTER — ANESTHESIA EVENT (OUTPATIENT)
Dept: ANESTHESIOLOGY | Facility: HOSPITAL | Age: 54
End: 2024-03-12

## 2024-03-14 ENCOUNTER — TELEPHONE (OUTPATIENT)
Dept: GASTROENTEROLOGY | Facility: MEDICAL CENTER | Age: 54
End: 2024-03-14

## 2024-03-14 NOTE — TELEPHONE ENCOUNTER
Did you remind:    You will receive a call a day prior to let you know when to arrive.  Yes    Do you have a copy of your instructions? Yes    Did you remind them of special diets if applicable? Yes    Did you remind patient to start clear liquid diet if applicable? Yes    Did you remind patient to hold:  NA      Do you have any other questions or concerns? No

## 2024-03-25 ENCOUNTER — ANESTHESIA EVENT (OUTPATIENT)
Dept: ANESTHESIOLOGY | Facility: HOSPITAL | Age: 54
End: 2024-03-25

## 2024-03-25 ENCOUNTER — ANESTHESIA (OUTPATIENT)
Dept: ANESTHESIOLOGY | Facility: HOSPITAL | Age: 54
End: 2024-03-25

## 2024-03-25 RX ORDER — SODIUM CHLORIDE 9 MG/ML
125 INJECTION, SOLUTION INTRAVENOUS CONTINUOUS
Status: CANCELLED | OUTPATIENT
Start: 2024-03-25

## 2024-03-25 RX ORDER — ONDANSETRON 2 MG/ML
4 INJECTION INTRAMUSCULAR; INTRAVENOUS EVERY 6 HOURS PRN
Status: CANCELLED | OUTPATIENT
Start: 2024-03-25

## 2024-03-25 NOTE — ANESTHESIA PREPROCEDURE EVALUATION
Procedure:  PRE-OP ONLY    Relevant Problems   ANESTHESIA   (+) Motion sickness      CARDIO   (+) Hemangioma of liver   (+) Hypertension      ENDO (within normal limits)      GI/HEPATIC   (+) GERD (gastroesophageal reflux disease)   (+) Hemangioma of liver      /RENAL (within normal limits)      GYN (within normal limits)      HEMATOLOGY (within normal limits)      MUSCULOSKELETAL (within normal limits)      NEURO/PSYCH   (+) Anxiety   (+) Depression      PULMONARY (within normal limits)             Anesthesia Plan  ASA Score- 2     Anesthesia Type- IV sedation with anesthesia with ASA Monitors.         Additional Monitors:     Airway Plan:            Plan Factors-Exercise tolerance (METS): >4 METS.    Chart reviewed.    Patient summary reviewed.    Patient is not a current smoker.              Induction- intravenous.    Postoperative Plan-     Informed Consent- Anesthetic plan and risks discussed with patient.

## 2024-03-26 ENCOUNTER — HOSPITAL ENCOUNTER (OUTPATIENT)
Dept: GASTROENTEROLOGY | Facility: MEDICAL CENTER | Age: 54
Setting detail: OUTPATIENT SURGERY
Discharge: HOME | End: 2024-03-26
Payer: COMMERCIAL

## 2024-03-26 ENCOUNTER — ANESTHESIA EVENT (OUTPATIENT)
Dept: GASTROENTEROLOGY | Facility: MEDICAL CENTER | Age: 54
End: 2024-03-26

## 2024-03-26 ENCOUNTER — ANESTHESIA (OUTPATIENT)
Dept: GASTROENTEROLOGY | Facility: MEDICAL CENTER | Age: 54
End: 2024-03-26

## 2024-03-26 VITALS
BODY MASS INDEX: 32.49 KG/M2 | HEART RATE: 70 BPM | RESPIRATION RATE: 18 BRPM | TEMPERATURE: 99.2 F | SYSTOLIC BLOOD PRESSURE: 105 MMHG | WEIGHT: 195 LBS | DIASTOLIC BLOOD PRESSURE: 71 MMHG | OXYGEN SATURATION: 95 % | HEIGHT: 65 IN

## 2024-03-26 DIAGNOSIS — K21.9 GASTROESOPHAGEAL REFLUX DISEASE WITHOUT ESOPHAGITIS: ICD-10-CM

## 2024-03-26 DIAGNOSIS — Z12.11 COLON CANCER SCREENING: ICD-10-CM

## 2024-03-26 PROCEDURE — 88305 TISSUE EXAM BY PATHOLOGIST: CPT | Performed by: PATHOLOGY

## 2024-03-26 RX ORDER — PROPOFOL 10 MG/ML
INJECTION, EMULSION INTRAVENOUS AS NEEDED
Status: DISCONTINUED | OUTPATIENT
Start: 2024-03-26 | End: 2024-03-26

## 2024-03-26 RX ORDER — ONDANSETRON 2 MG/ML
4 INJECTION INTRAMUSCULAR; INTRAVENOUS EVERY 6 HOURS PRN
Status: DISCONTINUED | OUTPATIENT
Start: 2024-03-26 | End: 2024-03-30 | Stop reason: HOSPADM

## 2024-03-26 RX ORDER — SODIUM CHLORIDE 9 MG/ML
125 INJECTION, SOLUTION INTRAVENOUS CONTINUOUS
Status: DISCONTINUED | OUTPATIENT
Start: 2024-03-26 | End: 2024-03-30 | Stop reason: HOSPADM

## 2024-03-26 RX ORDER — LIDOCAINE HYDROCHLORIDE 20 MG/ML
INJECTION, SOLUTION EPIDURAL; INFILTRATION; INTRACAUDAL; PERINEURAL AS NEEDED
Status: DISCONTINUED | OUTPATIENT
Start: 2024-03-26 | End: 2024-03-26

## 2024-03-26 RX ADMIN — PROPOFOL 50 MG: 10 INJECTION, EMULSION INTRAVENOUS at 08:00

## 2024-03-26 RX ADMIN — PROPOFOL 50 MG: 10 INJECTION, EMULSION INTRAVENOUS at 07:43

## 2024-03-26 RX ADMIN — PROPOFOL 50 MG: 10 INJECTION, EMULSION INTRAVENOUS at 07:55

## 2024-03-26 RX ADMIN — Medication 40 MG: at 07:47

## 2024-03-26 RX ADMIN — LIDOCAINE HYDROCHLORIDE 100 MG: 20 INJECTION, SOLUTION EPIDURAL; INFILTRATION; INTRACAUDAL at 07:32

## 2024-03-26 RX ADMIN — PROPOFOL 50 MG: 10 INJECTION, EMULSION INTRAVENOUS at 07:34

## 2024-03-26 RX ADMIN — PROPOFOL 50 MG: 10 INJECTION, EMULSION INTRAVENOUS at 07:37

## 2024-03-26 RX ADMIN — PROPOFOL 50 MG: 10 INJECTION, EMULSION INTRAVENOUS at 07:49

## 2024-03-26 RX ADMIN — SODIUM CHLORIDE 125 ML/HR: 0.9 INJECTION, SOLUTION INTRAVENOUS at 07:09

## 2024-03-26 RX ADMIN — PROPOFOL 150 MG: 10 INJECTION, EMULSION INTRAVENOUS at 07:32

## 2024-03-26 NOTE — ANESTHESIA PREPROCEDURE EVALUATION
Procedure:  COLONOSCOPY  EGD    Relevant Problems   ANESTHESIA   (+) Motion sickness      CARDIO   (+) Hemangioma of liver   (+) Hypertension      GI/HEPATIC   (+) GERD (gastroesophageal reflux disease)   (+) Hemangioma of liver      NEURO/PSYCH   (+) Anxiety   (+) Depression        Physical Exam    Airway    Mallampati score: I  TM Distance: >3 FB  Neck ROM: full     Dental   Comment: Upper bridge     Cardiovascular  Cardiovascular exam normal    Pulmonary  Pulmonary exam normal     Other Findings  post-pubertal.      Anesthesia Plan  ASA Score- 2     Anesthesia Type- IV sedation with anesthesia with ASA Monitors.         Additional Monitors:     Airway Plan:            Plan Factors-Exercise tolerance (METS): >4 METS.    Chart reviewed.    Patient summary reviewed.    Patient is not a current smoker.              Induction- intravenous.    Postoperative Plan-     Informed Consent- Anesthetic plan and risks discussed with patient.

## 2024-03-26 NOTE — ANESTHESIA POSTPROCEDURE EVALUATION
"Post-Op Assessment Note    CV Status:  Stable    Pain management: adequate       Mental Status:  Alert and awake   Hydration Status:  Euvolemic   PONV Controlled:  Controlled   Airway Patency:  Patent     Post Op Vitals Reviewed: Yes    No anethesia notable event occurred.    Staff: Anesthesiologist               BP      Temp      Pulse     Resp      SpO2      /71   Pulse 70   Temp 99.2 °F (37.3 °C) (Temporal)   Resp 18   Ht 5' 5\" (1.651 m)   Wt 88.5 kg (195 lb)   LMP 11/01/2022 (Approximate)   SpO2 95%   BMI 32.45 kg/m²     "

## 2024-03-26 NOTE — H&P
History and Physical - [facility]  Bethany Russell 53 y.o. female MRN: [id number]                  HPI: Bethany Russell is a 53 y.o. year old female who presents for GERD      REVIEW OF SYSTEMS: Per the HPI, and otherwise unremarkable.    Historical Information   Past Medical History:   Diagnosis Date    Allergic     Allergic rhinitis     last assessed 6/1/15, resolved 11/11/15    Anxiety     BRCA1 negative 2019    neg but with high percetage future testing suggested     BRCA2 negative 2019    neg     Cancer (HCC) 2003    Left kidney    Depression     Environmental allergies     GERD (gastroesophageal reflux disease)     Hypertension      Past Surgical History:   Procedure Laterality Date    BREAST CYST ASPIRATION Right     About 8 years    ELBOW SURGERY Left     NEPHRECTOMY Left     last assessed 8/1/14    SD HYSTEROSCOPY BX ENDOMETRIUM&/POLYPC W/WO D&C N/A 02/24/2022    Procedure: D&C WITH HYSTEROSCOPY;  Surgeon: Mendy Fowler DO;  Location: [facility];  Service: Gynecology    SD HYSTEROSCOPY ENDOMETRIAL ABLATION N/A 02/24/2022    Procedure: ABLATION ENDOMETRIAL HYDROTHERMAL;  Surgeon: Mendy Fowler DO;  [facility];  Service: Gynecology     Social History   Social History     Substance and Sexual Activity   Alcohol Use Yes    Comment: Social 1-2x monthly     Social History     Substance and Sexual Activity   Drug Use No     Social History     Tobacco Use   Smoking Status Never   Smokeless Tobacco Never     Family History   Problem Relation Age of Onset    Other Mother         health status good    Breast cancer Mother 60    Heart attack Father     Other Father         health status good    BRCA1 Negative Sister     BRCA2 Negative Sister     No Known Problems Daughter     Breast cancer Maternal Grandmother         age unknown    No Known Problems Maternal Grandfather     No Known Problems Paternal Grandmother     No Known Problems Paternal Grandfather     Breast cancer Maternal Aunt        "  Late 30's    Ovarian cancer Maternal Aunt         age unknown    No Known Problems Maternal Aunt     No Known Problems Maternal Aunt     No Known Problems Paternal Aunt        Meds/Allergies       Current Outpatient Medications:     cetirizine (ZyrTEC) 10 mg tablet    famotidine (PEPCID) 20 mg tablet    olmesartan-hydrochlorothiazide (BENICAR HCT) 20-12.5 MG per tablet    pantoprazole (PROTONIX) 40 mg tablet    sertraline (ZOLOFT) 100 mg tablet    albuterol (PROVENTIL HFA,VENTOLIN HFA) 90 mcg/act inhaler    fluticasone (FLONASE) 50 mcg/act nasal spray    hydrOXYzine HCL (ATARAX) 10 mg tablet    montelukast (SINGULAIR) 10 mg tablet    norethindrone (MICRONOR) 0.35 MG tablet    PEG 3350-KCl-NaBcb-NaCl-NaSulf (PEG-3350/Electrolytes) 236 g SOLR    tretinoin (RETIN-A) 0.025 % cream    Current Facility-Administered Medications:     sodium chloride 0.9 % infusion, 125 mL/hr, Intravenous, Continuous, Continue from Pre-op at 03/26/24 0724    Allergies   Allergen Reactions    Erythromycin Other (See Comments)     Oral sores    Erythromycin Base     Escitalopram      Annotation - [id number]: PT FEELS SHAKY W/ NAUSEA, DRY HEAVING / GASSY WHILE TAKING THIS MED.    Levofloxacin      Annotation - [id number]: FEELS SHAKY W/ NAUSEA WHEN ON MEDICATION.    Azithromycin Rash    Penicillins Hives and Rash       Objective     /77   Pulse 76   Temp 99.2 °F (37.3 °C) (Temporal)   Resp 18   Ht 5' 5\" (1.651 m)   Wt 88.5 kg (195 lb)   LMP 11/01/2022 (Approximate)   SpO2 98%   BMI 32.45 kg/m²       PHYSICAL EXAM    Gen: NAD  Head: NCAT  CV: RRR  CHEST: Clear  ABD: soft, NT/ND  EXT: no edema      ASSESSMENT/PLAN:  This is a 53 y.o. year old female here for GERD and colon cancer screening, and she is stable and optimized for her procedure.       "

## 2024-03-28 PROCEDURE — 88305 TISSUE EXAM BY PATHOLOGIST: CPT | Performed by: PATHOLOGY

## 2024-04-16 DIAGNOSIS — F41.9 ANXIETY: ICD-10-CM

## 2024-04-16 RX ORDER — HYDROXYZINE HYDROCHLORIDE 10 MG/1
TABLET, FILM COATED ORAL
Qty: 120 TABLET | Refills: 5 | Status: SHIPPED | OUTPATIENT
Start: 2024-04-16

## 2024-04-17 ENCOUNTER — OFFICE VISIT (OUTPATIENT)
Dept: GASTROENTEROLOGY | Facility: MEDICAL CENTER | Age: 54
End: 2024-04-17
Payer: COMMERCIAL

## 2024-04-17 VITALS
SYSTOLIC BLOOD PRESSURE: 128 MMHG | WEIGHT: 195 LBS | HEART RATE: 74 BPM | TEMPERATURE: 98.8 F | HEIGHT: 65 IN | DIASTOLIC BLOOD PRESSURE: 80 MMHG | BODY MASS INDEX: 32.49 KG/M2 | OXYGEN SATURATION: 98 %

## 2024-04-17 DIAGNOSIS — K44.9 HIATAL HERNIA: ICD-10-CM

## 2024-04-17 DIAGNOSIS — K21.9 GASTROESOPHAGEAL REFLUX DISEASE WITHOUT ESOPHAGITIS: Primary | ICD-10-CM

## 2024-04-17 PROCEDURE — 99213 OFFICE O/P EST LOW 20 MIN: CPT | Performed by: NURSE PRACTITIONER

## 2024-04-17 RX ORDER — FAMOTIDINE 40 MG/1
40 TABLET, FILM COATED ORAL
Qty: 30 TABLET | Refills: 3 | Status: SHIPPED | OUTPATIENT
Start: 2024-04-17

## 2024-04-17 NOTE — PROGRESS NOTES
[facility] - Outpatient Follow-up Note  Bethany Russell 53 y.o. female MRN: [id number]  Encounter: [id number]          ASSESSMENT AND PLAN:      1.  GERD  2.  Hiatal hernia    Recent EGD noted a 4 cm hiatal hernia and fundic gland polyps.  Biopsies were negative for H. pylori.  Currently taking pantoprazole 40 mg in the morning and famotidine 20 mg nightly.  Does get some breakthrough overnight and early morning.  Will crease famotidine to 40 mg nightly.  We did a long discussion about her hiatal hernia and treatment recommendations for it.  Will adjust her medications to assist in her symptom control.  May need to consider hiatal hernia repair in the future if symptoms are persistent.    -Continue pantoprazole 40 mg daily  -Increase famotidine to 40 mg nightly  -Antireflux diet  -Follow-up in 4 months or sooner if needed    ______________________________________________________________________    SUBJECTIVE: 53-year-old female here for follow-up.  She was last seen by myself 2/12/2024 for GERD and colon cancer screening.    Longstanding history of GERD.  Currently taking pantoprazole 40 mg in the morning and famotidine 20 mg nightly.  Reports she feels significantly better adding the famotidine but does still get some breakthrough early in the morning or overnight.  Denies any nausea, vomiting or dysphagia.  Recent EGD 3/24 noted a 4 cm hiatal hernia and fundic gland polyps.  Biopsies were negative for H. pylori.    BMs are brown and formed daily.  Denies any melena or hematochezia.  Recent colonoscopy noted scattered diverticulosis, 1 tubular adenoma and hemorrhoids.  Repeat colonoscopy recommended 5 years.    Prior EGD/colonoscopy     EGD 3/24-4 cm hiatal hernia, multiple fundic gland polyps.  Biopsies were negative for H. pylori.  Benign fundic gland polyps noted.    Colonoscopy 3/24-scattered diverticulosis, 1 polyp and hemorrhoids.  Biopsies noted tubular adenoma.  Repeat  colonoscopy in 5 years.    REVIEW OF SYSTEMS IS OTHERWISE NEGATIVE.  10 point view of systems negative other than per HPI      Historical Information   Past Medical History:   Diagnosis Date   • Allergic    • Allergic rhinitis     last assessed 6/1/15, resolved 11/11/15   • Anxiety    • BRCA1 negative 2019    neg but with high percetage future testing suggested    • BRCA2 negative 2019    neg    • Cancer (HCC) 2003    Left kidney   • Depression    • Environmental allergies    • GERD (gastroesophageal reflux disease)    • Hypertension      Past Surgical History:   Procedure Laterality Date   • BREAST CYST ASPIRATION Right     About 8 years   • ELBOW SURGERY Left    • NEPHRECTOMY Left     last assessed 8/1/14   • AL HYSTEROSCOPY BX ENDOMETRIUM&/POLYPC W/WO D&C N/A 02/24/2022    Procedure: D&C WITH HYSTEROSCOPY;  Surgeon: Mendy Fowler DO;  [facility];  Service: Gynecology   • AL HYSTEROSCOPY ENDOMETRIAL ABLATION N/A 02/24/2022    Procedure: ABLATION ENDOMETRIAL HYDROTHERMAL;  Surgeon: Mendy Fowler DO;  [facility];  Service: Gynecology     Social History   Social History     Substance and Sexual Activity   Alcohol Use Yes    Comment: Social 1-2x monthly     Social History     Substance and Sexual Activity   Drug Use No     Social History     Tobacco Use   Smoking Status Never   Smokeless Tobacco Never     Family History   Problem Relation Age of Onset   • Other Mother         health status good   • Breast cancer Mother 60   • Heart attack Father    • Other Father         health status good   • BRCA1 Negative Sister    • BRCA2 Negative Sister    • No Known Problems Daughter    • Breast cancer Maternal Grandmother         age unknown   • No Known Problems Maternal Grandfather    • No Known Problems Paternal Grandmother    • No Known Problems Paternal Grandfather    • Breast cancer Maternal Aunt         Late 30's   • Ovarian cancer Maternal Aunt         age unknown   • No Known Problems Maternal Aunt   "  • No Known Problems Maternal Aunt    • No Known Problems Paternal Aunt        Meds/Allergies       Current Outpatient Medications:   •  cetirizine (ZyrTEC) 10 mg tablet  •  famotidine (PEPCID) 20 mg tablet  •  famotidine (PEPCID) 40 MG tablet  •  hydrOXYzine HCL (ATARAX) 10 mg tablet  •  olmesartan-hydrochlorothiazide (BENICAR HCT) 20-12.5 MG per tablet  •  pantoprazole (PROTONIX) 40 mg tablet  •  sertraline (ZOLOFT) 100 mg tablet  •  tretinoin (RETIN-A) 0.025 % cream  •  albuterol (PROVENTIL HFA,VENTOLIN HFA) 90 mcg/act inhaler  •  fluticasone (FLONASE) 50 mcg/act nasal spray  •  montelukast (SINGULAIR) 10 mg tablet  •  norethindrone (MICRONOR) 0.35 MG tablet  •  PEG 3350-KCl-NaBcb-NaCl-NaSulf (PEG-3350/Electrolytes) 236 g SOLR    Allergies   Allergen Reactions   • Erythromycin Other (See Comments)     Oral sores   • Erythromycin Base    • Escitalopram      Annotation - [id number]: PT FEELS SHAKY W/ NAUSEA, DRY HEAVING / GASSY WHILE TAKING THIS MED.   • Levofloxacin      Annotation - [id number]: FEELS SHAKY W/ NAUSEA WHEN ON MEDICATION.   • Azithromycin Rash   • Penicillins Hives and Rash           Objective     Blood pressure 128/80, pulse 74, temperature 98.8 °F (37.1 °C), height 5' 5\" (1.651 m), weight 88.5 kg (195 lb), last menstrual period 11/01/2022, SpO2 98%, not currently breastfeeding. Body mass index is 32.45 kg/m².      PHYSICAL EXAM:      General Appearance:   Alert, cooperative, no distress   HEENT:   Normocephalic, atraumatic, anicteric.     Neck:  Supple, symmetrical, trachea midline   Lungs:   Clear to auscultation bilaterally; no rales, rhonchi or wheezing; respirations unlabored    Heart::   Regular rate and rhythm; no murmur, rub, or gallop.   Abdomen:   Soft, non-tender, non-distended; normal bowel sounds; no masses, no organomegaly    Genitalia:   Deferred    Rectal:   Deferred    Extremities:  No cyanosis, clubbing or edema    Pulses:  2+ and symmetric    Skin:  No jaundice, rashes, or lesions "    Lymph nodes:  No palpable cervical lymphadenopathy        Lab Results:   No visits with results within 1 Day(s) from this visit.   Latest known visit with results is:   Hospital Outpatient Visit on 03/26/2024   Component Date Value   • Case Report 03/26/2024                      Value:Surgical Pathology Report                         Case: [id number]                                  Authorizing Provider:  Phil Johnson MD              Collected:           03/26/2024 0735              Ordering Location:     [facility]        Received:            03/26/2024 [address] [place] [address] Endoscopy                                                     Pathologist:           Loretta Chauhan MD                                                       Specimens:   A) - Stomach, gastric bx, R/O H.pylori                                                              B) - Stomach, bx, gastric polyps                                                                    C) - Large Intestine, Sigmoid Colon, polyp-cold forcep                                    • Final Diagnosis 03/26/2024                      Value:This result contains rich text formatting which cannot be displayed here.   • Note 03/26/2024                      Value:This result contains rich text formatting which cannot be displayed here.   • Additional Information 03/26/2024                      Value:This result contains rich text formatting which cannot be displayed here.   • Synoptic Checklist 03/26/2024                      Value:                            COLON/RECTUM POLYP FORM - GI - All Specimens                                                                                     :    Adenoma(s)     • Gross Description 03/26/2024                      Value:This result contains rich text formatting which cannot be displayed here.         Radiology Results:   EGD    Addendum Date: 3/26/2024 Addendum:   [facility]  [facility] [address] [place] [address] [phone] DATE OF SERVICE: 3/26/24 PHYSICIAN(S): Attending: Phil Johnson MD Fellow: No Staff Documented INDICATION: Gastroesophageal reflux disease without esophagitis POST-OP DIAGNOSIS: See the impression below. PREPROCEDURE: Informed consent was obtained for the procedure, including sedation.  Risks of perforation, hemorrhage, adverse drug reaction and aspiration were discussed. The patient was placed in the left lateral decubitus position. Patient was explained about the risks and benefits of the procedure. Risks including but not limited to bleeding, infection, and perforation were explained in detail. Also explained about less than 100% sensitivity with the exam and other alternatives. PROCEDURE: EGD DETAILS OF PROCEDURE: Patient was taken to the procedure room where a time out was performed to confirm correct patient and correct procedure. The patient underwent monitored anesthesia care, which was administered by an anesthesia professional. The patient's blood pressure, heart rate, level of consciousness, respirations, oxygen, ECG and ETCO2 were monitored throughout the procedure. The scope was introduced through the mouth and advanced to the second part of the duodenum. Retroflexion was performed in the fundus. The patient experienced no blood loss. The procedure was not difficult. The patient tolerated the procedure well. There were no apparent adverse events. ANESTHESIA INFORMATION: ASA: II Anesthesia Type: IV Sedation with Anesthesia MEDICATIONS: sodium chloride 0.9 % infusion 400 mL*  *From user-documented volume simethicone (MYLICON) 40 mg in sterile water 60 mL 40 mg (Totals for administrations occurring from 0724 to 0807 on 03/26/24) FINDINGS: Regular Z-line 35 cm from the incisors -4 cm hiatal hernia - GE junction 39 cm from the incisors, diaphragmatic impression 35 cm from the incisors Multiple fundic gland polyps measuring 10-19 mm in the  fundus of the stomach; performed cold forceps biopsy The duodenum appeared normal. SPECIMENS: ID Type Source Tests Collected by Time Destination 1 : gastric bx, R/O H.pylori Tissue Stomach TISSUE EXAM Phil Johnson MD 3/26/2024  7:35 AM  2 : bx, gastric polyps Tissue Stomach TISSUE EXAM Phil Johnson MD 3/26/2024  7:37 AM  3 : polyp-cold forcep Tissue Large Intestine, Sigmoid Colon TISSUE EXAM Phil Johnson MD 3/26/2024  8:03 AM  IMPRESSION: -4 cm hiatal hernia Multiple fundic gland polyps measuring 10-19 mm in the fundus of the stomach; performed cold forceps biopsy The duodenum appeared normal. RECOMMENDATION:  Await pathology results  Discuss regarding holding off PPI in office Follow up in office for GERD  Phil Johnson MD     Result Date: 3/26/2024  Narrative: Table formatting from the original result was not included. [facility] [address] [place] [address] [phone] DATE OF SERVICE: 3/26/24 PHYSICIAN(S): Attending: Phil Johnson MD Fellow: No Staff Documented INDICATION: Gastroesophageal reflux disease without esophagitis POST-OP DIAGNOSIS: See the impression below. PREPROCEDURE: Informed consent was obtained for the procedure, including sedation.  Risks of perforation, hemorrhage, adverse drug reaction and aspiration were discussed. The patient was placed in the left lateral decubitus position. Patient was explained about the risks and benefits of the procedure. Risks including but not limited to bleeding, infection, and perforation were explained in detail. Also explained about less than 100% sensitivity with the exam and other alternatives. PROCEDURE: EGD DETAILS OF PROCEDURE: Patient was taken to the procedure room where a time out was performed to confirm correct patient and correct procedure. The patient underwent monitored anesthesia care, which was administered by an anesthesia professional. The patient's blood pressure, heart rate, level of consciousness, respirations,  oxygen, ECG and ETCO2 were monitored throughout the procedure. The scope was introduced through the mouth and advanced to the second part of the duodenum. Retroflexion was performed in the fundus. The patient experienced no blood loss. The procedure was not difficult. The patient tolerated the procedure well. There were no apparent adverse events. ANESTHESIA INFORMATION: ASA: II Anesthesia Type: IV Sedation with Anesthesia MEDICATIONS: sodium chloride 0.9 % infusion 400 mL*  *From user-documented volume simethicone (MYLICON) 40 mg in sterile water 60 mL 40 mg (Totals for administrations occurring from 0724 to 0807 on 03/26/24) FINDINGS: Regular Z-line 35 cm from the incisors -4 cm hiatal hernia - GE junction 39 cm from the incisors, diaphragmatic impression 35 cm from the incisors Multiple fundic gland polyps measuring 10-19 mm in the fundus of the stomach; performed cold forceps biopsy The duodenum appeared normal. SPECIMENS: ID Type Source Tests Collected by Time Destination 1 : gastric bx, R/O H.pylori Tissue Stomach TISSUE EXAM Phil Johnson MD 3/26/2024  7:35 AM  2 : bx, gastric polyps Tissue Stomach TISSUE EXAM Phil Johnson MD 3/26/2024  7:37 AM  3 : polyp-cold forcep Tissue Large Intestine, Sigmoid Colon TISSUE EXAM Phil Johnson MD 3/26/2024  8:03 AM      Impression: -4 cm hiatal hernia Multiple fundic gland polyps measuring 10-19 mm in the fundus of the stomach; performed cold forceps biopsy The duodenum appeared normal. RECOMMENDATION:  Await pathology results  Discuss regarding holding off PPI.  Phil Johnson MD     Colonoscopy    Result Date: 3/26/2024  Narrative: Table formatting from the original result was not included. [facility] [address] [place] [address] [phone] DATE OF SERVICE: 3/26/24 PHYSICIAN(S): Attending: Phil Johnson MD Fellow: No Staff Documented INDICATION: Colon cancer screening POST-OP DIAGNOSIS: See the impression below. HISTORY: Prior colonoscopy: No  prior colonoscopy. BOWEL PREPARATION: Golytely/Colyte/Trilyte PREPROCEDURE: Informed consent was obtained for the procedure, including sedation. Risks including but not limited to bleeding, infection, perforation, adverse drug reaction and aspiration were explained in detail. Also explained about less than 100% sensitivity with the exam and other alternatives. The patient was placed in the left lateral decubitus position. Procedure: Colonoscopy DETAILS OF PROCEDURE: Patient was taken to the procedure room where a time out was performed to confirm correct patient and correct procedure. The patient underwent monitored anesthesia care, which was administered by an anesthesia professional. The patient's blood pressure, heart rate, level of consciousness, oxygen, respirations, ECG and ETCO2 were monitored throughout the procedure. A digital rectal exam was performed. The scope was introduced through the anus and advanced to the cecum. Retroflexion was performed in the rectum. The quality of bowel preparation was evaluated using the [place] Bowel Preparation Scale with scores of: right colon = 2, transverse colon = 2, left colon = 2. The total BBPS score was 6. Bowel prep was adequate. The patient experienced no blood loss. The procedure was moderately difficult due to loops in the digestive tract. The patient tolerated the procedure well. There were no apparent adverse events. ANESTHESIA INFORMATION: ASA: II Anesthesia Type: IV Sedation with Anesthesia MEDICATIONS: sodium chloride 0.9 % infusion 400 mL*  *From user-documented volume simethicone (MYLICON) 40 mg in sterile water 60 mL 40 mg (Totals for administrations occurring from 0724 to 0808 on 03/26/24) FINDINGS: Polyp measuring 5-9 mm in the sigmoid colon; performed cold forceps biopsy with complete en bloc removal Internal medium hemorrhoids Multiple small diverticula of moderate severity in the ascending colon and sigmoid colon EVENTS: Procedure Events Event Event  Time ENDO CECUM REACHED 3/26/2024  7:52 AM ENDO SCOPE OUT TIME 3/26/2024  8:05 AM SPECIMENS: ID Type Source Tests Collected by Time Destination 1 : gastric bx, R/O H.pylori Tissue Stomach TISSUE EXAM Phil Johnson MD 3/26/2024  7:35 AM  2 : bx, gastric polyps Tissue Stomach TISSUE EXAM Phil Johnson MD 3/26/2024  7:37 AM  3 : polyp-cold forcep Tissue Large Intestine, Sigmoid Colon TISSUE EXAM Phil Johnson MD 3/26/2024  8:03 AM  EQUIPMENT: Colonoscope - ENDOCUFF VISION MED BLUE ID 11.0     Impression: Subcentimeter polyp in the sigmoid colon was removed with cold forceps biopsy Medium hemorrhoids Diverticulosis of moderate severity in the ascending colon and sigmoid colon RECOMMENDATION:  Repeat colonoscopy in 5 years  Personal history of colon polyps    Phil Johnson MD

## 2024-04-22 DIAGNOSIS — K21.00 GASTROESOPHAGEAL REFLUX DISEASE WITH ESOPHAGITIS: ICD-10-CM

## 2024-04-22 RX ORDER — PANTOPRAZOLE SODIUM 40 MG/1
TABLET, DELAYED RELEASE ORAL
Qty: 30 TABLET | Refills: 5 | Status: SHIPPED | OUTPATIENT
Start: 2024-04-22

## 2024-05-16 DIAGNOSIS — K21.9 GASTROESOPHAGEAL REFLUX DISEASE WITHOUT ESOPHAGITIS: ICD-10-CM

## 2024-05-16 DIAGNOSIS — F41.9 ANXIETY: ICD-10-CM

## 2024-05-16 RX ORDER — HYDROXYZINE HYDROCHLORIDE 10 MG/1
TABLET, FILM COATED ORAL
Qty: 360 TABLET | Refills: 0 | Status: SHIPPED | OUTPATIENT
Start: 2024-05-16

## 2024-05-16 RX ORDER — FAMOTIDINE 40 MG/1
40 TABLET, FILM COATED ORAL
Qty: 90 TABLET | Refills: 1 | Status: SHIPPED | OUTPATIENT
Start: 2024-05-16

## 2024-05-30 NOTE — TELEPHONE ENCOUNTER
----- Message from Adela Manuel DO sent at 11/9/2023 12:08 PM EST -----  Call patient. Labs normal overall other than cholesterol 219 and A1c 6.0 consistent with prediabetic state. Patient to have ADA diet. Restrict fat/cholesterol intake.   Would suggest repeating labs at follow-up visit in 6 months Covering/collaborating with Dr. Cholo MD.   Labs all good.  Rhonda Tam PA-C 5/30/2024 2:01 PM

## 2024-06-20 DIAGNOSIS — I10 PRIMARY HYPERTENSION: ICD-10-CM

## 2024-06-21 RX ORDER — OLMESARTAN MEDOXOMIL AND HYDROCHLOROTHIAZIDE 20/12.5 20; 12.5 MG/1; MG/1
1 TABLET ORAL DAILY
Qty: 30 TABLET | Refills: 5 | Status: SHIPPED | OUTPATIENT
Start: 2024-06-21

## 2024-07-02 ENCOUNTER — HOSPITAL ENCOUNTER (OUTPATIENT)
Dept: RADIOLOGY | Facility: IMAGING CENTER | Age: 54
Discharge: HOME | End: 2024-07-02
Payer: COMMERCIAL

## 2024-07-02 VITALS — WEIGHT: 195 LBS | HEIGHT: 65 IN | BODY MASS INDEX: 32.49 KG/M2

## 2024-07-02 DIAGNOSIS — Z12.31 ENCOUNTER FOR SCREENING MAMMOGRAM FOR MALIGNANT NEOPLASM OF BREAST: ICD-10-CM

## 2024-07-02 PROCEDURE — 77063 BREAST TOMOSYNTHESIS BI: CPT

## 2024-07-02 PROCEDURE — 77067 SCR MAMMO BI INCL CAD: CPT

## 2024-07-09 DIAGNOSIS — F41.9 ANXIETY: ICD-10-CM

## 2024-07-09 RX ORDER — SERTRALINE HYDROCHLORIDE 100 MG/1
100 TABLET, FILM COATED ORAL DAILY
Qty: 30 TABLET | Refills: 5 | Status: SHIPPED | OUTPATIENT
Start: 2024-07-09

## 2024-07-11 ENCOUNTER — HOSPITAL ENCOUNTER (OUTPATIENT)
Dept: ULTRASOUND IMAGING | Facility: HOSPITAL | Age: 54
Discharge: HOME | End: 2024-07-11
Payer: COMMERCIAL

## 2024-07-11 ENCOUNTER — HOSPITAL ENCOUNTER (OUTPATIENT)
Dept: MAMMOGRAPHY | Facility: HOSPITAL | Age: 54
Discharge: HOME | End: 2024-07-11
Payer: COMMERCIAL

## 2024-07-11 VITALS — BODY MASS INDEX: 32.49 KG/M2 | WEIGHT: 195 LBS | HEIGHT: 65 IN

## 2024-07-11 DIAGNOSIS — R92.8 ABNORMAL MAMMOGRAM: ICD-10-CM

## 2024-07-11 PROCEDURE — G0279 TOMOSYNTHESIS, MAMMO: HCPCS

## 2024-07-11 PROCEDURE — 77065 DX MAMMO INCL CAD UNI: CPT

## 2024-07-11 PROCEDURE — 76642 ULTRASOUND BREAST LIMITED: CPT

## 2024-10-07 ENCOUNTER — OFFICE VISIT (OUTPATIENT)
Age: 54
End: 2024-10-07
Payer: COMMERCIAL

## 2024-10-07 VITALS
WEIGHT: 204.2 LBS | BODY MASS INDEX: 34.02 KG/M2 | HEIGHT: 65 IN | TEMPERATURE: 98 F | OXYGEN SATURATION: 97 % | HEART RATE: 76 BPM | SYSTOLIC BLOOD PRESSURE: 138 MMHG | DIASTOLIC BLOOD PRESSURE: 90 MMHG

## 2024-10-07 DIAGNOSIS — D17.24 LIPOMA OF LEFT LOWER EXTREMITY: ICD-10-CM

## 2024-10-07 DIAGNOSIS — J01.00 ACUTE NON-RECURRENT MAXILLARY SINUSITIS: Primary | ICD-10-CM

## 2024-10-07 PROCEDURE — 99214 OFFICE O/P EST MOD 30 MIN: CPT | Performed by: FAMILY MEDICINE

## 2024-10-07 RX ORDER — DOXYCYCLINE HYCLATE 100 MG
100 TABLET ORAL 2 TIMES DAILY
Qty: 14 TABLET | Refills: 0 | Status: SHIPPED | OUTPATIENT
Start: 2024-10-07 | End: 2024-10-14

## 2024-10-07 NOTE — PROGRESS NOTES
"Assessment/Plan: Patient will observe lipoma left anterior thigh.  Patient will have this removed if it increases in size or any worsening symptoms are noted.  Patient will use doxycycline for sinusitis.  Other guidance given overall.  Patient will follow-up in the next few months for reevaluation       Diagnoses and all orders for this visit:    Acute non-recurrent maxillary sinusitis  -     doxycycline hyclate (VIBRA-TABS) 100 mg tablet; Take 1 tablet (100 mg total) by mouth 2 (two) times a day for 7 days    Lipoma of left lower extremity            Subjective:        Patient ID: Bethany Russell is a 53 y.o. female.      Patient is here with sinus pressure nasal congestion rhinorrhea right ear pain over the past 10 days.  Patient using Motrin.  Patient with some right ear itching and discomfort.  Patient also with lump on left thigh anteriorly over the past 3 weeks.  No trauma noted.    Earache   Associated symptoms include rhinorrhea. Pertinent negatives include no coughing.   Sinus Problem  Associated symptoms include congestion, ear pain and sinus pressure. Pertinent negatives include no coughing.         The following portions of the patient's history were reviewed and updated as appropriate: allergies, current medications, past family history, past medical history, past social history, past surgical history and problem list.      Review of Systems   Constitutional:  Negative for fever.   HENT:  Positive for congestion, ear pain, postnasal drip, rhinorrhea, sinus pressure and sinus pain.    Respiratory:  Negative for cough.    Skin:  Positive for color change.           Objective:        Depression Screening and Follow-up Plan: Patient was screened for depression during today's encounter. They screened negative with a PHQ-9 score of 0.            /90 (BP Location: Right arm, Patient Position: Sitting, Cuff Size: Standard)   Pulse 76   Temp 98 °F (36.7 °C) (Temporal)   Ht 5' 5\" (1.651 m)   Wt 92.6 kg " (204 lb 3.2 oz)   LMP 11/01/2022 (Approximate)   SpO2 97%   BMI 33.98 kg/m²          Physical Exam  Vitals and nursing note reviewed.   Constitutional:       General: She is not in acute distress.     Appearance: Normal appearance. She is not ill-appearing, toxic-appearing or diaphoretic.   HENT:      Head: Normocephalic and atraumatic.      Right Ear: Tympanic membrane, ear canal and external ear normal. There is no impacted cerumen.      Left Ear: Tympanic membrane, ear canal and external ear normal. There is no impacted cerumen.      Nose: Nose normal. No congestion or rhinorrhea.      Mouth/Throat:      Mouth: Mucous membranes are moist.      Pharynx: No oropharyngeal exudate or posterior oropharyngeal erythema.   Eyes:      General: No scleral icterus.        Right eye: No discharge.         Left eye: No discharge.   Neck:      Vascular: No carotid bruit.   Cardiovascular:      Rate and Rhythm: Normal rate and regular rhythm.      Pulses: Normal pulses.      Heart sounds: Normal heart sounds. No murmur heard.     No friction rub. No gallop.   Pulmonary:      Effort: Pulmonary effort is normal. No respiratory distress.      Breath sounds: Normal breath sounds. No stridor. No wheezing, rhonchi or rales.   Chest:      Chest wall: No tenderness.   Musculoskeletal:         General: No swelling, tenderness, deformity or signs of injury. Normal range of motion.      Cervical back: Normal range of motion and neck supple. No rigidity. No muscular tenderness.      Right lower leg: No edema.      Left lower leg: No edema.   Lymphadenopathy:      Cervical: No cervical adenopathy.   Skin:     General: Skin is warm and dry.      Capillary Refill: Capillary refill takes less than 2 seconds.      Coloration: Skin is not jaundiced.      Findings: Lesion present. No bruising, erythema or rash.      Comments: Subcutaneous nodule left anterior thigh.   Neurological:      Mental Status: She is alert and oriented to person,  place, and time. Mental status is at baseline.      Cranial Nerves: No cranial nerve deficit.      Sensory: No sensory deficit.      Motor: No weakness.      Coordination: Coordination normal.      Gait: Gait normal.   Psychiatric:         Mood and Affect: Mood normal.         Behavior: Behavior normal.         Thought Content: Thought content normal.         Judgment: Judgment normal.

## 2024-10-20 DIAGNOSIS — K21.00 GASTROESOPHAGEAL REFLUX DISEASE WITH ESOPHAGITIS: ICD-10-CM

## 2024-10-21 RX ORDER — PANTOPRAZOLE SODIUM 40 MG/1
TABLET, DELAYED RELEASE ORAL
Qty: 30 TABLET | Refills: 5 | Status: SHIPPED | OUTPATIENT
Start: 2024-10-21

## 2024-11-06 PROBLEM — J01.00 ACUTE NON-RECURRENT MAXILLARY SINUSITIS: Status: RESOLVED | Noted: 2024-10-07 | Resolved: 2024-11-06

## 2024-11-19 DIAGNOSIS — K21.9 GASTROESOPHAGEAL REFLUX DISEASE WITHOUT ESOPHAGITIS: ICD-10-CM

## 2024-11-19 RX ORDER — FAMOTIDINE 40 MG/1
40 TABLET, FILM COATED ORAL
Qty: 90 TABLET | Refills: 1 | Status: SHIPPED | OUTPATIENT
Start: 2024-11-19

## 2024-12-02 ENCOUNTER — TELEMEDICINE (OUTPATIENT)
Age: 54
End: 2024-12-02
Payer: COMMERCIAL

## 2024-12-02 DIAGNOSIS — J01.00 ACUTE NON-RECURRENT MAXILLARY SINUSITIS: Primary | ICD-10-CM

## 2024-12-02 PROCEDURE — 99213 OFFICE O/P EST LOW 20 MIN: CPT | Performed by: FAMILY MEDICINE

## 2024-12-02 RX ORDER — DOXYCYCLINE HYCLATE 100 MG
100 TABLET ORAL 2 TIMES DAILY
Qty: 20 TABLET | Refills: 0 | Status: SHIPPED | OUTPATIENT
Start: 2024-12-02 | End: 2024-12-12

## 2024-12-02 NOTE — PROGRESS NOTES
Virtual Regular Visit  Name: Bethany Russell      : 1970      MRN: [id number]  Encounter Provider: Cash Alvarado DO  Encounter Date: 2024   Encounter department: [facility]      Verification of patient location:  Patient is located at Home in the following state in which I hold an active license [place] :  Assessment & Plan  Acute non-recurrent maxillary sinusitis    Orders:    doxycycline hyclate (VIBRA-TABS) 100 mg tablet; Take 1 tablet (100 mg total) by mouth 2 (two) times a day for 10 days        Encounter provider Cash Alvarado DO    The patient was identified by name and date of birth. Bethany Russell was informed that this is a telemedicine visit and that the visit is being conducted through the Epic Embedded platform. She agrees to proceed..  My office door was closed. No one else was in the room.  She acknowledged consent and understanding of privacy and security of the video platform. The patient has agreed to participate and understands they can discontinue the visit at any time.    Patient is aware this is a billable service.     History of Present Illness     Patient with sinus related issues over the past 2 weeks.  Patient with sore throat postnasal drip nasal congestion rhinorrhea.  Some cough noted.  Patient did not test for COVID.  Patient to use Mucinex Sudafed nasal rinses and other over-the-counter medications without significant improvement.  Appetite is decreased.  No nausea vomiting or diarrhea.  Possible fever noted.      Review of Systems   Constitutional:  Positive for fever.   HENT:  Positive for congestion, postnasal drip, rhinorrhea, sinus pressure, sinus pain and sore throat.    Eyes: Negative.    Respiratory:  Positive for cough.    Cardiovascular: Negative.    Gastrointestinal: Negative.    Endocrine: Negative.    Genitourinary: Negative.    Musculoskeletal: Negative.    Skin: Negative.    Allergic/Immunologic: Negative.    Neurological: Negative.     Hematological: Negative.    Psychiatric/Behavioral: Negative.         Objective   LMP 11/01/2022 (Approximate)     Physical Exam  Vitals and nursing note reviewed.   Constitutional:       General: She is not in acute distress.     Appearance: She is not toxic-appearing or diaphoretic.   HENT:      Head: Normocephalic and atraumatic.   Pulmonary:      Effort: Pulmonary effort is normal.   Neurological:      Mental Status: She is alert.   Psychiatric:         Mood and Affect: Mood normal.         Behavior: Behavior normal.         Thought Content: Thought content normal.         Judgment: Judgment normal.         Visit Time  Total Visit Duration: 31

## 2024-12-11 DIAGNOSIS — J30.1 SEASONAL ALLERGIC RHINITIS DUE TO POLLEN: ICD-10-CM

## 2024-12-12 RX ORDER — MONTELUKAST SODIUM 10 MG/1
10 TABLET ORAL
Qty: 30 TABLET | Refills: 5 | Status: SHIPPED | OUTPATIENT
Start: 2024-12-12

## 2024-12-20 NOTE — TELEPHONE ENCOUNTER
----- Message from Cynda Schaumann, DO sent at 11/9/2023 12:08 PM EST -----  Call patient. Labs normal overall other than cholesterol 219 and A1c 6.0 consistent with prediabetic state. Patient to have ADA diet. Restrict fat/cholesterol intake.   Would suggest repeating labs at follow-up visit in 6 months
Left message to call office for lab result instruction
Single

## 2024-12-21 DIAGNOSIS — I10 PRIMARY HYPERTENSION: ICD-10-CM

## 2024-12-23 RX ORDER — OLMESARTAN MEDOXOMIL AND HYDROCHLOROTHIAZIDE 20/12.5 20; 12.5 MG/1; MG/1
1 TABLET ORAL DAILY
Qty: 30 TABLET | Refills: 0 | Status: SHIPPED | OUTPATIENT
Start: 2024-12-23

## 2024-12-23 NOTE — TELEPHONE ENCOUNTER
Patient needs updated blood work. Please place orders. A courtesy refill was provided.       Pt needs cbc, cmp and lipids

## 2025-01-07 DIAGNOSIS — F41.9 ANXIETY: ICD-10-CM

## 2025-01-08 RX ORDER — SERTRALINE HYDROCHLORIDE 100 MG/1
100 TABLET, FILM COATED ORAL DAILY
Qty: 30 TABLET | Refills: 0 | Status: SHIPPED | OUTPATIENT
Start: 2025-01-08

## 2025-01-22 DIAGNOSIS — I10 PRIMARY HYPERTENSION: ICD-10-CM

## 2025-01-22 RX ORDER — OLMESARTAN MEDOXOMIL AND HYDROCHLOROTHIAZIDE 20/12.5 20; 12.5 MG/1; MG/1
1 TABLET ORAL DAILY
Qty: 30 TABLET | Refills: 0 | Status: SHIPPED | OUTPATIENT
Start: 2025-01-22

## 2025-02-07 DIAGNOSIS — F41.9 ANXIETY: ICD-10-CM

## 2025-02-07 RX ORDER — SERTRALINE HYDROCHLORIDE 100 MG/1
100 TABLET, FILM COATED ORAL DAILY
Qty: 90 TABLET | Refills: 0 | Status: SHIPPED | OUTPATIENT
Start: 2025-02-07

## 2025-02-14 ENCOUNTER — OFFICE VISIT (OUTPATIENT)
Age: 55
End: 2025-02-14
Payer: COMMERCIAL

## 2025-02-14 VITALS
DIASTOLIC BLOOD PRESSURE: 84 MMHG | BODY MASS INDEX: 32.65 KG/M2 | HEIGHT: 65 IN | OXYGEN SATURATION: 99 % | SYSTOLIC BLOOD PRESSURE: 126 MMHG | WEIGHT: 196 LBS | HEART RATE: 112 BPM | TEMPERATURE: 99.3 F

## 2025-02-14 DIAGNOSIS — J01.00 ACUTE MAXILLARY SINUSITIS, RECURRENCE NOT SPECIFIED: Primary | ICD-10-CM

## 2025-02-14 PROCEDURE — 99213 OFFICE O/P EST LOW 20 MIN: CPT | Performed by: FAMILY MEDICINE

## 2025-02-14 RX ORDER — DOXYCYCLINE HYCLATE 100 MG
100 TABLET ORAL 2 TIMES DAILY
Qty: 20 TABLET | Refills: 0 | Status: SHIPPED | OUTPATIENT
Start: 2025-02-14 | End: 2025-02-24

## 2025-02-14 NOTE — PROGRESS NOTES
Assessment/Plan:       Diagnoses and all orders for this visit:    Acute maxillary sinusitis, recurrence not specified  Comments:  Patient use Sudafed as well as supportive care as directed.  Patient use doxycycline as directed  Orders:  -     doxycycline hyclate (VIBRA-TABS) 100 mg tablet; Take 1 tablet (100 mg total) by mouth 2 (two) times a day for 10 days            Subjective:        Patient ID: Bethany Russell is a 54 y.o. female.      Patient is here with some sinus pain and pressure left side greater than right with some congestion and rhinorrhea for roughly 1 week.  Patient with chills.  No vomiting or diarrhea associated with this.  Patient is fatigued.  Some bodyaches noted.  Patient using Aleve.  Patient using Sudafed as well as nasal rinse and nasal spray.    Sinusitis  Associated symptoms include chills, congestion, coughing and sinus pressure. Pertinent negatives include no sore throat.         The following portions of the patient's history were reviewed and updated as appropriate: allergies, current medications, past family history, past medical history, past social history, past surgical history and problem list.      Review of Systems   Constitutional:  Positive for chills and fatigue. Negative for fever.   HENT:  Positive for congestion, postnasal drip, rhinorrhea, sinus pressure and sinus pain. Negative for sore throat.    Eyes: Negative.    Respiratory:  Positive for cough.    Cardiovascular: Negative.    Gastrointestinal: Negative.    Endocrine: Negative.    Genitourinary: Negative.    Musculoskeletal:  Positive for arthralgias.   Skin: Negative.    Allergic/Immunologic: Negative.    Neurological: Negative.    Hematological: Negative.    Psychiatric/Behavioral: Negative.             Objective:        Depression Screening and Follow-up Plan: Patient was screened for depression during today's encounter. They screened negative with a PHQ-9 score of 0.              /84 (BP Location: Left arm,  "Patient Position: Sitting, Cuff Size: Large)   Pulse (!) 112   Temp 99.3 °F (37.4 °C) (Temporal)   Ht 5' 5\" (1.651 m)   Wt 88.9 kg (196 lb)   LMP 11/01/2022 (Approximate)   SpO2 99%   BMI 32.62 kg/m²          Physical Exam  Vitals and nursing note reviewed.   Constitutional:       General: She is not in acute distress.     Appearance: Normal appearance. She is not ill-appearing, toxic-appearing or diaphoretic.   HENT:      Head: Normocephalic and atraumatic.      Right Ear: Tympanic membrane, ear canal and external ear normal. There is no impacted cerumen.      Left Ear: Tympanic membrane, ear canal and external ear normal. There is no impacted cerumen.      Nose: Nose normal. No congestion or rhinorrhea.      Mouth/Throat:      Mouth: Mucous membranes are moist.      Pharynx: Oropharyngeal exudate present. No posterior oropharyngeal erythema.   Eyes:      General: No scleral icterus.        Right eye: No discharge.         Left eye: No discharge.   Neck:      Vascular: No carotid bruit.   Cardiovascular:      Rate and Rhythm: Normal rate and regular rhythm.      Pulses: Normal pulses.      Heart sounds: Normal heart sounds. No murmur heard.     No friction rub. No gallop.   Pulmonary:      Effort: Pulmonary effort is normal. No respiratory distress.      Breath sounds: Normal breath sounds. No stridor. No wheezing, rhonchi or rales.   Chest:      Chest wall: No tenderness.   Abdominal:      Palpations: Abdomen is soft.   Musculoskeletal:         General: No swelling, tenderness, deformity or signs of injury. Normal range of motion.      Cervical back: Normal range of motion and neck supple. No rigidity. No muscular tenderness.      Right lower leg: No edema.      Left lower leg: No edema.   Lymphadenopathy:      Cervical: No cervical adenopathy.   Skin:     General: Skin is warm and dry.      Capillary Refill: Capillary refill takes less than 2 seconds.      Coloration: Skin is not jaundiced.      Findings: " No bruising, erythema, lesion or rash.   Neurological:      Mental Status: She is alert and oriented to person, place, and time. Mental status is at baseline.      Cranial Nerves: No cranial nerve deficit.      Sensory: No sensory deficit.      Motor: No weakness.      Coordination: Coordination normal.      Gait: Gait normal.   Psychiatric:         Mood and Affect: Mood normal.         Behavior: Behavior normal.         Thought Content: Thought content normal.         Judgment: Judgment normal.

## 2025-02-23 DIAGNOSIS — I10 PRIMARY HYPERTENSION: ICD-10-CM

## 2025-02-24 RX ORDER — OLMESARTAN MEDOXOMIL AND HYDROCHLOROTHIAZIDE 20/12.5 20; 12.5 MG/1; MG/1
1 TABLET ORAL DAILY
Qty: 30 TABLET | Refills: 5 | Status: SHIPPED | OUTPATIENT
Start: 2025-02-24

## 2025-03-06 ENCOUNTER — TELEMEDICINE (OUTPATIENT)
Age: 55
End: 2025-03-06
Payer: COMMERCIAL

## 2025-03-06 DIAGNOSIS — F41.9 ANXIETY: ICD-10-CM

## 2025-03-06 DIAGNOSIS — F51.01 PRIMARY INSOMNIA: Primary | ICD-10-CM

## 2025-03-06 PROCEDURE — 99213 OFFICE O/P EST LOW 20 MIN: CPT | Performed by: FAMILY MEDICINE

## 2025-03-06 RX ORDER — LORAZEPAM 1 MG/1
0.5 TABLET ORAL EVERY 8 HOURS PRN
Qty: 20 TABLET | Refills: 0 | Status: SHIPPED | OUTPATIENT
Start: 2025-03-06

## 2025-03-06 NOTE — LETTER
March 6, 2025     Patient: Bethany Russell  YOB: 1970  Date of Visit: 3/6/2025      To Whom it May Concern:    Bethany Russell is under my professional care. Bethany was seen on 3/6/2025. Bethany may return to work on 3/10/2025 .    If you have any questions or concerns, please don't hesitate to call.         Sincerely,          Cash Alvarado,         CC: No Recipients

## 2025-03-06 NOTE — PROGRESS NOTES
Virtual Regular VisitName: Bethany Russell      : 1970      MRN: [id number]  Encounter Provider: Cash Alvarado DO  Encounter Date: 3/6/2025   Encounter department: [facility]  :  Assessment & Plan  Primary insomnia    Orders:    LORazepam (ATIVAN) 1 mg tablet; Take 0.5 tablets (0.5 mg total) by mouth every 8 (eight) hours as needed for anxiety    Anxiety    Orders:    LORazepam (ATIVAN) 1 mg tablet; Take 0.5 tablets (0.5 mg total) by mouth every 8 (eight) hours as needed for anxiety        History of Present Illness     Patient with increased insomnia as well as anxiety over the past week or so due to mother at end stage of life.  Patient's mother is on hospice and expected to pass in the next 2 to 3 days.  Patient will need a note for work      Review of Systems   Constitutional: Negative.    HENT: Negative.     Eyes: Negative.    Respiratory: Negative.     Cardiovascular: Negative.    Gastrointestinal: Negative.    Endocrine: Negative.    Genitourinary: Negative.    Musculoskeletal: Negative.    Skin: Negative.    Allergic/Immunologic: Negative.    Neurological: Negative.    Hematological: Negative.    Psychiatric/Behavioral:  Positive for sleep disturbance. The patient is nervous/anxious.        Objective   LMP 2022 (Approximate)     Physical Exam  Vitals and nursing note reviewed.   Constitutional:       General: She is not in acute distress.     Appearance: She is not ill-appearing, toxic-appearing or diaphoretic.      Comments: Patient tearful discussing mother's situation   HENT:      Head: Normocephalic.   Neurological:      Mental Status: She is alert.   Psychiatric:         Behavior: Behavior normal.         Thought Content: Thought content normal.         Judgment: Judgment normal.      Comments: Anxious         Administrative Statements   Encounter provider Cash Alvarado DO    The Patient is located at Home and in the following state in which I hold an active license  [place].    The patient was identified by name and date of birth. Bethany Russell was informed that this is a telemedicine visit and that the visit is being conducted through the Epic Embedded platform. She agrees to proceed..  My office door was closed. No one else was in the room.  She acknowledged consent and understanding of privacy and security of the video platform. The patient has agreed to participate and understands they can discontinue the visit at any time.    I have spent a total time of 30 minutes in caring for this patient on the day of the visit/encounter including Diagnostic results, Prognosis, Risks and benefits of tx options, Instructions for management, Patient and family education, Importance of tx compliance, Risk factor reductions, Impressions, Counseling / Coordination of care, Documenting in the medical record, Reviewing/placing orders in the medical record (including tests, medications, and/or procedures), and Obtaining or reviewing history  , not including the time spent for establishing the audio/video connection.

## 2025-03-06 NOTE — ASSESSMENT & PLAN NOTE
Orders:    LORazepam (ATIVAN) 1 mg tablet; Take 0.5 tablets (0.5 mg total) by mouth every 8 (eight) hours as needed for anxiety

## 2025-03-22 DIAGNOSIS — K21.00 GASTROESOPHAGEAL REFLUX DISEASE WITH ESOPHAGITIS: ICD-10-CM

## 2025-03-24 RX ORDER — PANTOPRAZOLE SODIUM 40 MG/1
40 TABLET, DELAYED RELEASE ORAL DAILY
Qty: 90 TABLET | Refills: 2 | Status: SHIPPED | OUTPATIENT
Start: 2025-03-24

## 2025-03-28 ENCOUNTER — PATIENT MESSAGE (OUTPATIENT)
Age: 55
End: 2025-03-28

## 2025-03-28 DIAGNOSIS — J01.00 ACUTE MAXILLARY SINUSITIS, RECURRENCE NOT SPECIFIED: ICD-10-CM

## 2025-03-28 DIAGNOSIS — J30.1 SEASONAL ALLERGIC RHINITIS DUE TO POLLEN: Primary | ICD-10-CM

## 2025-03-28 RX ORDER — FLUTICASONE PROPIONATE 50 MCG
2 SPRAY, SUSPENSION (ML) NASAL DAILY
Qty: 16 G | Refills: 3 | Status: SHIPPED | OUTPATIENT
Start: 2025-03-28

## 2025-04-20 DIAGNOSIS — J01.00 ACUTE MAXILLARY SINUSITIS, RECURRENCE NOT SPECIFIED: ICD-10-CM

## 2025-04-20 DIAGNOSIS — J30.1 SEASONAL ALLERGIC RHINITIS DUE TO POLLEN: ICD-10-CM

## 2025-04-21 RX ORDER — FLUTICASONE PROPIONATE 50 MCG
SPRAY, SUSPENSION (ML) NASAL
Qty: 48 ML | Refills: 1 | Status: SHIPPED | OUTPATIENT
Start: 2025-04-21

## 2025-05-07 DIAGNOSIS — F41.9 ANXIETY: ICD-10-CM

## 2025-05-07 RX ORDER — SERTRALINE HYDROCHLORIDE 100 MG/1
100 TABLET, FILM COATED ORAL DAILY
Qty: 30 TABLET | Refills: 2 | Status: SHIPPED | OUTPATIENT
Start: 2025-05-07

## 2025-05-26 DIAGNOSIS — K21.9 GASTROESOPHAGEAL REFLUX DISEASE WITHOUT ESOPHAGITIS: ICD-10-CM

## 2025-05-27 RX ORDER — FAMOTIDINE 40 MG/1
40 TABLET, FILM COATED ORAL
Qty: 30 TABLET | Refills: 5 | Status: SHIPPED | OUTPATIENT
Start: 2025-05-27

## 2025-06-03 DIAGNOSIS — J30.1 SEASONAL ALLERGIC RHINITIS DUE TO POLLEN: ICD-10-CM

## 2025-06-03 RX ORDER — MONTELUKAST SODIUM 10 MG/1
10 TABLET ORAL
Qty: 30 TABLET | Refills: 5 | Status: SHIPPED | OUTPATIENT
Start: 2025-06-03

## 2025-06-10 ENCOUNTER — PATIENT MESSAGE (OUTPATIENT)
Age: 55
End: 2025-06-10

## 2025-06-10 DIAGNOSIS — J01.00 ACUTE NON-RECURRENT MAXILLARY SINUSITIS: Primary | ICD-10-CM

## 2025-06-10 RX ORDER — DOXYCYCLINE HYCLATE 100 MG
100 TABLET ORAL 2 TIMES DAILY
Qty: 14 TABLET | Refills: 0 | Status: SHIPPED | OUTPATIENT
Start: 2025-06-10 | End: 2025-06-17

## 2025-06-10 NOTE — TELEPHONE ENCOUNTER
Please see above message from patient, currently using inhaler and taking steroids but still having a runny nose and it yellow and dark, patient is just looking for additional guidance

## 2025-06-20 DIAGNOSIS — B37.9 CANDIDIASIS: Primary | ICD-10-CM

## 2025-06-20 RX ORDER — FLUCONAZOLE 150 MG/1
150 TABLET ORAL ONCE
Qty: 1 TABLET | Refills: 0 | Status: SHIPPED | OUTPATIENT
Start: 2025-06-20 | End: 2025-06-20

## 2025-07-23 ENCOUNTER — HOSPITAL ENCOUNTER (OUTPATIENT)
Dept: RADIOLOGY | Facility: IMAGING CENTER | Age: 55
Discharge: HOME | End: 2025-07-23
Attending: OBSTETRICS & GYNECOLOGY
Payer: COMMERCIAL

## 2025-07-23 VITALS — BODY MASS INDEX: 32.49 KG/M2 | WEIGHT: 195 LBS | HEIGHT: 65 IN

## 2025-07-23 DIAGNOSIS — Z12.31 ENCOUNTER FOR SCREENING MAMMOGRAM FOR MALIGNANT NEOPLASM OF BREAST: ICD-10-CM

## 2025-07-23 PROCEDURE — 77063 BREAST TOMOSYNTHESIS BI: CPT

## 2025-07-23 PROCEDURE — 77067 SCR MAMMO BI INCL CAD: CPT

## 2025-07-26 DIAGNOSIS — F41.9 ANXIETY: ICD-10-CM

## 2025-07-28 RX ORDER — SERTRALINE HYDROCHLORIDE 100 MG/1
100 TABLET, FILM COATED ORAL DAILY
Qty: 30 TABLET | Refills: 5 | Status: SHIPPED | OUTPATIENT
Start: 2025-07-28

## 2025-08-08 ENCOUNTER — OFFICE VISIT (OUTPATIENT)
Age: 55
End: 2025-08-08
Payer: COMMERCIAL

## 2025-08-08 VITALS
OXYGEN SATURATION: 97 % | WEIGHT: 200.2 LBS | DIASTOLIC BLOOD PRESSURE: 84 MMHG | HEIGHT: 65 IN | HEART RATE: 84 BPM | TEMPERATURE: 98.4 F | SYSTOLIC BLOOD PRESSURE: 132 MMHG | BODY MASS INDEX: 33.36 KG/M2

## 2025-08-08 DIAGNOSIS — Z11.59 NEED FOR HEPATITIS C SCREENING TEST: ICD-10-CM

## 2025-08-08 DIAGNOSIS — Z00.00 ANNUAL PHYSICAL EXAM: Primary | ICD-10-CM

## 2025-08-08 DIAGNOSIS — F41.9 ANXIETY: ICD-10-CM

## 2025-08-08 PROCEDURE — 99396 PREV VISIT EST AGE 40-64: CPT | Performed by: FAMILY MEDICINE

## 2025-08-08 RX ORDER — HYDROXYZINE HYDROCHLORIDE 10 MG/1
10 TABLET, FILM COATED ORAL EVERY 6 HOURS PRN
Qty: 360 TABLET | Refills: 0 | Status: SHIPPED | OUTPATIENT
Start: 2025-08-08

## 2025-08-15 ENCOUNTER — APPOINTMENT (OUTPATIENT)
Age: 55
End: 2025-08-15
Payer: COMMERCIAL

## (undated) DEVICE — 2000CC GUARDIAN II: Brand: GUARDIAN

## (undated) DEVICE — GLOVE INDICATOR PI UNDERGLOVE SZ 7 BLUE

## (undated) DEVICE — IV EXTENSION TUBING 33 IN

## (undated) DEVICE — DRAPE EQUIPMENT RF WAND

## (undated) DEVICE — STERILE 8 INCH PROCTO SWAB: Brand: CARDINAL HEALTH

## (undated) DEVICE — PREMIUM DRY TRAY LF: Brand: MEDLINE INDUSTRIES, INC.

## (undated) DEVICE — UNDER BUTTOCKS DRAPE W/FLUID CONTROL POUCH: Brand: CONVERTORS

## (undated) DEVICE — GLOVE PI ULTRA TOUCH SZ.7.0

## (undated) DEVICE — SCD SEQUENTIAL COMPRESSION COMFORT SLEEVE MEDIUM KNEE LENGTH: Brand: KENDALL SCD

## (undated) DEVICE — PROCEDURE SET: Brand: GENESYS HTA PROCERVA

## (undated) DEVICE — PVC URETHRAL CATHETER: Brand: DOVER

## (undated) DEVICE — CYSTO TUBING SINGLE IRRIGATION

## (undated) DEVICE — STRL ALLENTOWN HYSTEROSCOPY PK: Brand: CARDINAL HEALTH

## (undated) DEVICE — STERILE SURGICAL LUBRICANT,  TUBE: Brand: SURGILUBE